# Patient Record
Sex: FEMALE | Race: WHITE | Employment: FULL TIME | URBAN - METROPOLITAN AREA
[De-identification: names, ages, dates, MRNs, and addresses within clinical notes are randomized per-mention and may not be internally consistent; named-entity substitution may affect disease eponyms.]

---

## 2017-05-25 PROBLEM — F32.A DEPRESSIVE DISORDER: Chronic | Status: ACTIVE | Noted: 2017-05-25

## 2021-01-08 ENCOUNTER — APPOINTMENT (OUTPATIENT)
Dept: GENERAL RADIOLOGY | Age: 51
DRG: 863 | End: 2021-01-08
Payer: COMMERCIAL

## 2021-01-08 ENCOUNTER — HOSPITAL ENCOUNTER (INPATIENT)
Age: 51
LOS: 4 days | Discharge: HOME | DRG: 863 | End: 2021-01-12
Attending: EMERGENCY MEDICINE | Admitting: INTERNAL MEDICINE
Payer: COMMERCIAL

## 2021-01-08 DIAGNOSIS — T81.49XA SURGICAL WOUND INFECTION: ICD-10-CM

## 2021-01-08 DIAGNOSIS — S91.309A OPEN WOUND OF FOOT EXCLUDING TOES: ICD-10-CM

## 2021-01-08 DIAGNOSIS — E87.1 HYPONATREMIA: Primary | ICD-10-CM

## 2021-01-08 LAB
ALBUMIN SERPL-MCNC: 4.4 G/DL (ref 3.5–5.2)
ALP BLD-CCNC: 77 U/L (ref 35–104)
ALT SERPL-CCNC: 22 U/L (ref 0–32)
ANION GAP SERPL CALCULATED.3IONS-SCNC: 9 MMOL/L (ref 7–16)
AST SERPL-CCNC: 22 U/L (ref 0–31)
BACTERIA: ABNORMAL /HPF
BASOPHILS ABSOLUTE: 0.08 E9/L (ref 0–0.2)
BASOPHILS RELATIVE PERCENT: 0.8 % (ref 0–2)
BILIRUB SERPL-MCNC: 0.4 MG/DL (ref 0–1.2)
BILIRUBIN URINE: NEGATIVE
BLOOD, URINE: ABNORMAL
BUN BLDV-MCNC: 10 MG/DL (ref 6–20)
C-REACTIVE PROTEIN: 0.1 MG/DL (ref 0–0.4)
CALCIUM SERPL-MCNC: 9.3 MG/DL (ref 8.6–10.2)
CHLORIDE BLD-SCNC: 90 MMOL/L (ref 98–107)
CHLORIDE URINE RANDOM: 50 MMOL/L
CLARITY: CLEAR
CO2: 24 MMOL/L (ref 22–29)
COLOR: YELLOW
CREAT SERPL-MCNC: 0.7 MG/DL (ref 0.5–1)
CREATININE URINE: 75 MG/DL (ref 29–226)
EOSINOPHILS ABSOLUTE: 0.23 E9/L (ref 0.05–0.5)
EOSINOPHILS RELATIVE PERCENT: 2.3 % (ref 0–6)
GFR AFRICAN AMERICAN: >60
GFR NON-AFRICAN AMERICAN: >60 ML/MIN/1.73
GLUCOSE BLD-MCNC: 98 MG/DL (ref 74–99)
GLUCOSE URINE: NEGATIVE MG/DL
HCT VFR BLD CALC: 37.8 % (ref 34–48)
HEMOGLOBIN: 12.8 G/DL (ref 11.5–15.5)
IMMATURE GRANULOCYTES #: 0.07 E9/L
IMMATURE GRANULOCYTES %: 0.7 % (ref 0–5)
KETONES, URINE: ABNORMAL MG/DL
LACTIC ACID: 1 MMOL/L (ref 0.5–2.2)
LEUKOCYTE ESTERASE, URINE: NEGATIVE
LYMPHOCYTES ABSOLUTE: 2.14 E9/L (ref 1.5–4)
LYMPHOCYTES RELATIVE PERCENT: 21.7 % (ref 20–42)
MCH RBC QN AUTO: 31.7 PG (ref 26–35)
MCHC RBC AUTO-ENTMCNC: 33.9 % (ref 32–34.5)
MCV RBC AUTO: 93.6 FL (ref 80–99.9)
MONOCYTES ABSOLUTE: 0.74 E9/L (ref 0.1–0.95)
MONOCYTES RELATIVE PERCENT: 7.5 % (ref 2–12)
NEUTROPHILS ABSOLUTE: 6.61 E9/L (ref 1.8–7.3)
NEUTROPHILS RELATIVE PERCENT: 67 % (ref 43–80)
NITRITE, URINE: NEGATIVE
OSMOLALITY URINE: 411 MOSM/KG (ref 300–900)
OSMOLALITY: 273 MOSM/KG (ref 285–310)
PDW BLD-RTO: 13.6 FL (ref 11.5–15)
PH UA: 5.5 (ref 5–9)
PLATELET # BLD: 490 E9/L (ref 130–450)
PMV BLD AUTO: 9 FL (ref 7–12)
POTASSIUM REFLEX MAGNESIUM: 3.8 MMOL/L (ref 3.5–5)
POTASSIUM, UR: 44.5 MMOL/L
PROTEIN UA: NEGATIVE MG/DL
RBC # BLD: 4.04 E12/L (ref 3.5–5.5)
RBC UA: ABNORMAL /HPF (ref 0–2)
SEDIMENTATION RATE, ERYTHROCYTE: 25 MM/HR (ref 0–20)
SODIUM BLD-SCNC: 123 MMOL/L (ref 132–146)
SODIUM URINE: 45 MMOL/L
SPECIFIC GRAVITY UA: 1.01 (ref 1–1.03)
TOTAL PROTEIN: 7.5 G/DL (ref 6.4–8.3)
TSH SERPL DL<=0.05 MIU/L-ACNC: 2.21 UIU/ML (ref 0.27–4.2)
UROBILINOGEN, URINE: 0.2 E.U./DL
WBC # BLD: 9.9 E9/L (ref 4.5–11.5)
WBC UA: ABNORMAL /HPF (ref 0–5)

## 2021-01-08 PROCEDURE — 85651 RBC SED RATE NONAUTOMATED: CPT

## 2021-01-08 PROCEDURE — 81001 URINALYSIS AUTO W/SCOPE: CPT

## 2021-01-08 PROCEDURE — 87070 CULTURE OTHR SPECIMN AEROBIC: CPT

## 2021-01-08 PROCEDURE — 84300 ASSAY OF URINE SODIUM: CPT

## 2021-01-08 PROCEDURE — 1200000000 HC SEMI PRIVATE

## 2021-01-08 PROCEDURE — 80053 COMPREHEN METABOLIC PANEL: CPT

## 2021-01-08 PROCEDURE — 99283 EMERGENCY DEPT VISIT LOW MDM: CPT

## 2021-01-08 PROCEDURE — 82436 ASSAY OF URINE CHLORIDE: CPT

## 2021-01-08 PROCEDURE — 96374 THER/PROPH/DIAG INJ IV PUSH: CPT

## 2021-01-08 PROCEDURE — 87040 BLOOD CULTURE FOR BACTERIA: CPT

## 2021-01-08 PROCEDURE — 86140 C-REACTIVE PROTEIN: CPT

## 2021-01-08 PROCEDURE — 84133 ASSAY OF URINE POTASSIUM: CPT

## 2021-01-08 PROCEDURE — 2580000003 HC RX 258: Performed by: NURSE PRACTITIONER

## 2021-01-08 PROCEDURE — 85025 COMPLETE CBC W/AUTO DIFF WBC: CPT

## 2021-01-08 PROCEDURE — 83935 ASSAY OF URINE OSMOLALITY: CPT

## 2021-01-08 PROCEDURE — 6370000000 HC RX 637 (ALT 250 FOR IP): Performed by: INTERNAL MEDICINE

## 2021-01-08 PROCEDURE — 36415 COLL VENOUS BLD VENIPUNCTURE: CPT

## 2021-01-08 PROCEDURE — 6360000002 HC RX W HCPCS: Performed by: INTERNAL MEDICINE

## 2021-01-08 PROCEDURE — 73630 X-RAY EXAM OF FOOT: CPT

## 2021-01-08 PROCEDURE — 2500000003 HC RX 250 WO HCPCS: Performed by: NURSE PRACTITIONER

## 2021-01-08 PROCEDURE — 83930 ASSAY OF BLOOD OSMOLALITY: CPT

## 2021-01-08 PROCEDURE — 83605 ASSAY OF LACTIC ACID: CPT

## 2021-01-08 PROCEDURE — 82570 ASSAY OF URINE CREATININE: CPT

## 2021-01-08 PROCEDURE — 87075 CULTR BACTERIA EXCEPT BLOOD: CPT

## 2021-01-08 PROCEDURE — 84443 ASSAY THYROID STIM HORMONE: CPT

## 2021-01-08 RX ORDER — ALBUTEROL SULFATE 2.5 MG/3ML
2.5 SOLUTION RESPIRATORY (INHALATION) EVERY 6 HOURS PRN
Status: DISCONTINUED | OUTPATIENT
Start: 2021-01-08 | End: 2021-01-12 | Stop reason: HOSPADM

## 2021-01-08 RX ORDER — AMOXICILLIN AND CLAVULANATE POTASSIUM 875; 125 MG/1; MG/1
1 TABLET, FILM COATED ORAL EVERY 12 HOURS SCHEDULED
Status: DISCONTINUED | OUTPATIENT
Start: 2021-01-08 | End: 2021-01-12 | Stop reason: HOSPADM

## 2021-01-08 RX ORDER — LORAZEPAM 1 MG/1
1 TABLET ORAL 2 TIMES DAILY
Status: DISCONTINUED | OUTPATIENT
Start: 2021-01-08 | End: 2021-01-12 | Stop reason: HOSPADM

## 2021-01-08 RX ORDER — AMOXICILLIN AND CLAVULANATE POTASSIUM 875; 125 MG/1; MG/1
1 TABLET, FILM COATED ORAL 2 TIMES DAILY
COMMUNITY

## 2021-01-08 RX ORDER — 0.9 % SODIUM CHLORIDE 0.9 %
1000 INTRAVENOUS SOLUTION INTRAVENOUS ONCE
Status: COMPLETED | OUTPATIENT
Start: 2021-01-08 | End: 2021-01-08

## 2021-01-08 RX ORDER — ALBUTEROL SULFATE 90 UG/1
2 AEROSOL, METERED RESPIRATORY (INHALATION) EVERY 6 HOURS PRN
Status: DISCONTINUED | OUTPATIENT
Start: 2021-01-08 | End: 2021-01-08 | Stop reason: CLARIF

## 2021-01-08 RX ORDER — LORAZEPAM 1 MG/1
1 TABLET ORAL 2 TIMES DAILY
COMMUNITY

## 2021-01-08 RX ORDER — HYDROCODONE BITARTRATE AND ACETAMINOPHEN 5; 325 MG/1; MG/1
1 TABLET ORAL EVERY 6 HOURS PRN
COMMUNITY

## 2021-01-08 RX ORDER — HYDROCODONE BITARTRATE AND ACETAMINOPHEN 5; 325 MG/1; MG/1
1 TABLET ORAL EVERY 6 HOURS PRN
Status: DISCONTINUED | OUTPATIENT
Start: 2021-01-08 | End: 2021-01-12 | Stop reason: HOSPADM

## 2021-01-08 RX ORDER — BUSPIRONE HYDROCHLORIDE 15 MG/1
15 TABLET ORAL 2 TIMES DAILY
COMMUNITY

## 2021-01-08 RX ORDER — HEPARIN SODIUM 10000 [USP'U]/ML
5000 INJECTION, SOLUTION INTRAVENOUS; SUBCUTANEOUS 2 TIMES DAILY
Status: DISCONTINUED | OUTPATIENT
Start: 2021-01-08 | End: 2021-01-12 | Stop reason: HOSPADM

## 2021-01-08 RX ORDER — CETIRIZINE HYDROCHLORIDE 10 MG/1
10 TABLET ORAL DAILY
COMMUNITY

## 2021-01-08 RX ORDER — BUDESONIDE AND FORMOTEROL FUMARATE DIHYDRATE 160; 4.5 UG/1; UG/1
1 AEROSOL RESPIRATORY (INHALATION) EVERY 12 HOURS
Status: DISCONTINUED | OUTPATIENT
Start: 2021-01-08 | End: 2021-01-08 | Stop reason: CLARIF

## 2021-01-08 RX ORDER — IBUPROFEN 800 MG/1
800 TABLET ORAL EVERY 6 HOURS PRN
Status: DISCONTINUED | OUTPATIENT
Start: 2021-01-08 | End: 2021-01-12 | Stop reason: HOSPADM

## 2021-01-08 RX ORDER — BUDESONIDE 0.5 MG/2ML
0.5 INHALANT ORAL 2 TIMES DAILY
Status: DISCONTINUED | OUTPATIENT
Start: 2021-01-08 | End: 2021-01-12 | Stop reason: HOSPADM

## 2021-01-08 RX ORDER — CLINDAMYCIN PHOSPHATE 600 MG/50ML
600 INJECTION INTRAVENOUS ONCE
Status: COMPLETED | OUTPATIENT
Start: 2021-01-08 | End: 2021-01-08

## 2021-01-08 RX ORDER — SERTRALINE HYDROCHLORIDE 100 MG/1
100 TABLET, FILM COATED ORAL DAILY
Status: DISCONTINUED | OUTPATIENT
Start: 2021-01-08 | End: 2021-01-12 | Stop reason: HOSPADM

## 2021-01-08 RX ORDER — BUSPIRONE HYDROCHLORIDE 5 MG/1
15 TABLET ORAL 2 TIMES DAILY
Status: DISCONTINUED | OUTPATIENT
Start: 2021-01-08 | End: 2021-01-12 | Stop reason: HOSPADM

## 2021-01-08 RX ORDER — SERTRALINE HYDROCHLORIDE 100 MG/1
100 TABLET, FILM COATED ORAL DAILY
COMMUNITY

## 2021-01-08 RX ORDER — ARFORMOTEROL TARTRATE 15 UG/2ML
15 SOLUTION RESPIRATORY (INHALATION) 2 TIMES DAILY
Status: DISCONTINUED | OUTPATIENT
Start: 2021-01-08 | End: 2021-01-12 | Stop reason: HOSPADM

## 2021-01-08 RX ORDER — CETIRIZINE HYDROCHLORIDE 10 MG/1
10 TABLET ORAL DAILY
Status: DISCONTINUED | OUTPATIENT
Start: 2021-01-09 | End: 2021-01-12 | Stop reason: HOSPADM

## 2021-01-08 RX ADMIN — BUSPIRONE HYDROCHLORIDE 15 MG: 5 TABLET ORAL at 23:45

## 2021-01-08 RX ADMIN — HEPARIN SODIUM 5000 UNITS: 10000 INJECTION, SOLUTION INTRAVENOUS; SUBCUTANEOUS at 21:30

## 2021-01-08 RX ADMIN — SERTRALINE 100 MG: 100 TABLET, FILM COATED ORAL at 23:39

## 2021-01-08 RX ADMIN — SODIUM CHLORIDE 1000 ML: 9 INJECTION, SOLUTION INTRAVENOUS at 18:49

## 2021-01-08 RX ADMIN — CLINDAMYCIN IN 5 PERCENT DEXTROSE 600 MG: 12 INJECTION, SOLUTION INTRAVENOUS at 18:41

## 2021-01-08 RX ADMIN — AMOXICILLIN AND CLAVULANATE POTASSIUM 1 TABLET: 875; 125 TABLET, FILM COATED ORAL at 23:45

## 2021-01-08 RX ADMIN — HYDROCODONE BITARTRATE AND ACETAMINOPHEN 1 TABLET: 5; 325 TABLET ORAL at 23:39

## 2021-01-08 RX ADMIN — LORAZEPAM 1 MG: 1 TABLET ORAL at 23:39

## 2021-01-08 ASSESSMENT — PAIN - FUNCTIONAL ASSESSMENT: PAIN_FUNCTIONAL_ASSESSMENT: ACTIVITIES ARE NOT PREVENTED

## 2021-01-08 ASSESSMENT — PAIN DESCRIPTION - LOCATION: LOCATION: FOOT

## 2021-01-08 ASSESSMENT — PAIN DESCRIPTION - ONSET: ONSET: ON-GOING

## 2021-01-08 ASSESSMENT — PAIN DESCRIPTION - PAIN TYPE: TYPE: SURGICAL PAIN

## 2021-01-08 ASSESSMENT — PAIN DESCRIPTION - ORIENTATION: ORIENTATION: RIGHT

## 2021-01-08 ASSESSMENT — PAIN SCALES - GENERAL: PAINLEVEL_OUTOF10: 6

## 2021-01-08 NOTE — ED PROVIDER NOTES
ED Attending  CC: Rima       [facility]  Department of Emergency Medicine   ED  Encounter Note  Admit Date/RoomTime: 2021  3:29 PM  ED Room: [facility]/8380-F    NAME: Jazmín Lo  : 1970  MRN: [id number]     Chief Complaint:  Wound Check (had bunionectomy  and is infected. on augmentin, but getting worse )    History of Present Illness        Jazmín Lo is a 44 y.o. old female who presents to the emergency department by private vehicle, for evaluation of surgical incision located on right medial foot, which occured 12 day(s) prior to arrival.  Patient had a bunionectomy on right foot on 2020. Had a 1 week follow-up on Monday and when had Steri-Strips placed and was placed on Augmentin twice daily for 10 days. . The wound is / has open, tender and serosanguinous drainage. She denies any fevers, chills, shortness breath, chest pain, erythema. Surgery is employed at [facility] by Dr. Francis Polanco      ROS   Pertinent positives and negatives are stated within HPI, all other systems reviewed and are negative. Past Medical History:  has a past medical history of Anxiety, Asthma, Chronic back pain greater than 3 months duration, Concussion, and Pneumonia. Surgical History:  has a past surgical history that includes  section; Bunionectomy (Bilateral, 2020); and Achilles tendon surgery (Left, 2020). Social History:  reports that she has never smoked. She has never used smokeless tobacco. She reports that she does not drink alcohol or use drugs. Family History: family history includes Cancer in her father and sister. Allergies: Patient has no known allergies.     Physical Exam   Oxygen Saturation Interpretation: Normal.        ED Triage Vitals [21 1525]   BP Temp Temp Source Pulse Resp SpO2 Height Weight   (!) 140/77 98.9 °F (37.2 °C) Tympanic 108 16 99 % 5' 1\" (1.549 m) 127 lb (57.6 kg)           Constitutional:  Alert, development consistent with age. HEENT:  NC/NT. Airway patent. Neck:  Normal ROM. Supple. Respiratory:  Clear to auscultation and breath sounds equal.  CV:  Regular rate and rhythm, normal heart sounds, without pathological murmurs, ectopy, gallops, or rubs. GI:  Abdomen Soft, nontender, good bowel sounds. No firm or pulsatile mass. Back:  No costovertebral tenderness. Extremities: No tenderness or edema noted. SEE PICTURE BELOW FOR RIGHT FOOT. PT and DP pulses are 2+. Cap refill is less than 3 secs. patient has second incision that is in the healing process on the lateral aspect of the right foot also. Integument:  Normal turgor. Warm, dry, without visible rash, unless noted elsewhere. Lymphatics: No lymphangitis or adenopathy noted. Neurological:  Oriented. Motor functions intact.              Lab / Imaging Results   (All laboratory and radiology results have been personally reviewed by myself)  Labs:  Results for orders placed or performed during the hospital encounter of 01/08/21   CBC auto differential   Result Value Ref Range    WBC 9.9 4.5 - 11.5 E9/L    RBC 4.04 3.50 - 5.50 E12/L    Hemoglobin 12.8 11.5 - 15.5 g/dL    Hematocrit 37.8 34.0 - 48.0 %    MCV 93.6 80.0 - 99.9 fL    MCH 31.7 26.0 - 35.0 pg    MCHC 33.9 32.0 - 34.5 %    RDW 13.6 11.5 - 15.0 fL    Platelets 489 (H) 457 - 450 E9/L    MPV 9.0 7.0 - 12.0 fL    Neutrophils % 67.0 43.0 - 80.0 %    Immature Granulocytes % 0.7 0.0 - 5.0 %    Lymphocytes % 21.7 20.0 - 42.0 %    Monocytes % 7.5 2.0 - 12.0 %    Eosinophils % 2.3 0.0 - 6.0 %    Basophils % 0.8 0.0 - 2.0 %    Neutrophils Absolute 6.61 1.80 - 7.30 E9/L    Immature Granulocytes # 0.07 E9/L    Lymphocytes Absolute 2.14 1.50 - 4.00 E9/L    Monocytes Absolute 0.74 0.10 - 0.95 E9/L    Eosinophils Absolute 0.23 0.05 - 0.50 E9/L    Basophils Absolute 0.08 0.00 - 0.20 E9/L   Comprehensive Metabolic Panel w/ Reflex to MG   Result Value Ref Range    Sodium 123 (L) 132 - 146 mmol/L    Potassium reflex

## 2021-01-09 PROBLEM — J45.909 MODERATE ASTHMA: Status: ACTIVE | Noted: 2021-01-09

## 2021-01-09 PROBLEM — F41.9 ANXIETY: Status: ACTIVE | Noted: 2021-01-09

## 2021-01-09 PROBLEM — T81.49XA SURGICAL WOUND INFECTION: Status: ACTIVE | Noted: 2021-01-09

## 2021-01-09 LAB
ALBUMIN SERPL-MCNC: 3.8 G/DL (ref 3.5–5.2)
ALP BLD-CCNC: 70 U/L (ref 35–104)
ALT SERPL-CCNC: 19 U/L (ref 0–32)
ANION GAP SERPL CALCULATED.3IONS-SCNC: 8 MMOL/L (ref 7–16)
AST SERPL-CCNC: 21 U/L (ref 0–31)
BILIRUB SERPL-MCNC: 0.4 MG/DL (ref 0–1.2)
BUN BLDV-MCNC: 8 MG/DL (ref 6–20)
CALCIUM SERPL-MCNC: 9 MG/DL (ref 8.6–10.2)
CHLORIDE BLD-SCNC: 102 MMOL/L (ref 98–107)
CO2: 24 MMOL/L (ref 22–29)
CREAT SERPL-MCNC: 0.5 MG/DL (ref 0.5–1)
GFR AFRICAN AMERICAN: >60
GFR NON-AFRICAN AMERICAN: >60 ML/MIN/1.73
GLUCOSE BLD-MCNC: 102 MG/DL (ref 74–99)
HCT VFR BLD CALC: 35.1 % (ref 34–48)
HEMOGLOBIN: 11.5 G/DL (ref 11.5–15.5)
MCH RBC QN AUTO: 31.1 PG (ref 26–35)
MCHC RBC AUTO-ENTMCNC: 32.8 % (ref 32–34.5)
MCV RBC AUTO: 94.9 FL (ref 80–99.9)
PDW BLD-RTO: 13.7 FL (ref 11.5–15)
PLATELET # BLD: 446 E9/L (ref 130–450)
PMV BLD AUTO: 8.8 FL (ref 7–12)
POTASSIUM SERPL-SCNC: 3.9 MMOL/L (ref 3.5–5)
RBC # BLD: 3.7 E12/L (ref 3.5–5.5)
SODIUM BLD-SCNC: 134 MMOL/L (ref 132–146)
TOTAL PROTEIN: 6.6 G/DL (ref 6.4–8.3)
URIC ACID, SERUM: 2.3 MG/DL (ref 2.4–5.7)
WBC # BLD: 9.6 E9/L (ref 4.5–11.5)

## 2021-01-09 PROCEDURE — 2500000003 HC RX 250 WO HCPCS: Performed by: INTERNAL MEDICINE

## 2021-01-09 PROCEDURE — 2580000003 HC RX 258: Performed by: INTERNAL MEDICINE

## 2021-01-09 PROCEDURE — 6370000000 HC RX 637 (ALT 250 FOR IP): Performed by: INTERNAL MEDICINE

## 2021-01-09 PROCEDURE — 85027 COMPLETE CBC AUTOMATED: CPT

## 2021-01-09 PROCEDURE — 1200000000 HC SEMI PRIVATE

## 2021-01-09 PROCEDURE — 36415 COLL VENOUS BLD VENIPUNCTURE: CPT

## 2021-01-09 PROCEDURE — 80053 COMPREHEN METABOLIC PANEL: CPT

## 2021-01-09 PROCEDURE — 84550 ASSAY OF BLOOD/URIC ACID: CPT

## 2021-01-09 RX ORDER — CLINDAMYCIN PHOSPHATE 600 MG/50ML
600 INJECTION INTRAVENOUS EVERY 8 HOURS
Status: DISCONTINUED | OUTPATIENT
Start: 2021-01-09 | End: 2021-01-12 | Stop reason: HOSPADM

## 2021-01-09 RX ORDER — DEXTROSE MONOHYDRATE 50 MG/ML
INJECTION, SOLUTION INTRAVENOUS CONTINUOUS
Status: DISCONTINUED | OUTPATIENT
Start: 2021-01-09 | End: 2021-01-10

## 2021-01-09 RX ADMIN — CLINDAMYCIN PHOSPHATE 600 MG: 600 INJECTION, SOLUTION INTRAVENOUS at 23:54

## 2021-01-09 RX ADMIN — BUSPIRONE HYDROCHLORIDE 15 MG: 5 TABLET ORAL at 08:21

## 2021-01-09 RX ADMIN — AMOXICILLIN AND CLAVULANATE POTASSIUM 1 TABLET: 875; 125 TABLET, FILM COATED ORAL at 08:21

## 2021-01-09 RX ADMIN — BUSPIRONE HYDROCHLORIDE 15 MG: 5 TABLET ORAL at 20:35

## 2021-01-09 RX ADMIN — LORAZEPAM 1 MG: 1 TABLET ORAL at 20:35

## 2021-01-09 RX ADMIN — CLINDAMYCIN PHOSPHATE 600 MG: 600 INJECTION, SOLUTION INTRAVENOUS at 08:45

## 2021-01-09 RX ADMIN — HYDROCODONE BITARTRATE AND ACETAMINOPHEN 1 TABLET: 5; 325 TABLET ORAL at 15:23

## 2021-01-09 RX ADMIN — CETIRIZINE HYDROCHLORIDE 10 MG: 10 TABLET, FILM COATED ORAL at 08:21

## 2021-01-09 RX ADMIN — DEXTROSE MONOHYDRATE: 50 INJECTION, SOLUTION INTRAVENOUS at 10:36

## 2021-01-09 RX ADMIN — HYDROCODONE BITARTRATE AND ACETAMINOPHEN 1 TABLET: 5; 325 TABLET ORAL at 08:50

## 2021-01-09 RX ADMIN — AMOXICILLIN AND CLAVULANATE POTASSIUM 1 TABLET: 875; 125 TABLET, FILM COATED ORAL at 20:35

## 2021-01-09 RX ADMIN — SERTRALINE 100 MG: 100 TABLET, FILM COATED ORAL at 08:21

## 2021-01-09 RX ADMIN — CLINDAMYCIN PHOSPHATE 600 MG: 600 INJECTION, SOLUTION INTRAVENOUS at 15:50

## 2021-01-09 RX ADMIN — LORAZEPAM 1 MG: 1 TABLET ORAL at 08:21

## 2021-01-09 RX ADMIN — HYDROCODONE BITARTRATE AND ACETAMINOPHEN 1 TABLET: 5; 325 TABLET ORAL at 21:46

## 2021-01-09 ASSESSMENT — PAIN DESCRIPTION - PROGRESSION: CLINICAL_PROGRESSION: NOT CHANGED

## 2021-01-09 ASSESSMENT — PAIN DESCRIPTION - LOCATION
LOCATION: HEAD;FOOT
LOCATION: FOOT

## 2021-01-09 NOTE — H&P
History and Physical  Internal Medicine  Shantelle Ugarte MD    CHIEF COMPLAINT:  drainage      HISTORY OF PRESENT ILLNESS:      The patient is a 44 y.o. female patient of mine who presents with drainage from foot wound - as per ER -   Sharmila Paulina is a 44 y.o. old female who presents to the emergency department by private vehicle, for evaluation of surgical incision located on right medial foot, which occured 12 day(s) prior to arrival.  Patient had a bunionectomy on right foot on 2020. Had a 1 week follow-up on Monday and when had Steri-Strips placed and was placed on Augmentin twice daily for 10 days. . The wound is / has open, tender and serosanguinous drainage. She denies any fevers, chills, shortness breath, chest pain, erythema. Surgery is employed at [address] [place] [address] by Dr. Alix Vega         Past Medical History:   Diagnosis Date    Anxiety     Asthma     Chronic back pain greater than 3 months duration     Concussion     Pneumonia            Past Surgical History:   Procedure Laterality Date    ACHILLES TENDON SURGERY Left 2020    legnthenting    BUNIONECTOMY Bilateral 2020     SECTION         Medications Prior to Admission:    Medications Prior to Admission: fluticasone-salmeterol (ADVAIR) 500-50 MCG/DOSE diskus inhaler, Inhale 1 puff into the lungs every 12 hours  sertraline (ZOLOFT) 100 MG tablet, Take 100 mg by mouth daily At night  busPIRone (BUSPAR) 15 MG tablet, Take 15 mg by mouth 2 times daily  LORazepam (ATIVAN) 1 MG tablet, Take 1 mg by mouth 2 times daily.   HYDROcodone-acetaminophen (NORCO) 5-325 MG per tablet, Take 1 tablet by mouth every 6 hours as needed for Pain.  amoxicillin-clavulanate (AUGMENTIN) 875-125 MG per tablet, Take 1 tablet by mouth 2 times daily  cetirizine (ZYRTEC) 10 MG tablet, Take 10 mg by mouth daily  albuterol sulfate  (90 BASE) MCG/ACT inhaler, Inhale 2 puffs into the lungs every 6 hours as needed for Wheezing  ibuprofen (ADVIL;MOTRIN) 800 MG tablet, Take 800 mg by mouth every 6 hours as needed for Pain    Allergies:    Patient has no known allergies. Social History:    reports that she has never smoked. She has never used smokeless tobacco. She reports that she does not drink alcohol or use drugs. Family History:   family history includes Cancer in her father and sister. REVIEW OF SYSTEMS:  As above in the HPI, otherwise negative    Vital Signs:  /72   Pulse 84   Temp 98.4 °F (36.9 °C) (Oral)   Resp 16   Ht 5' (1.524 m)   Wt 133 lb (60.3 kg)   SpO2 100%   BMI 25.97 kg/m²     Physical Exam:    General appearance: alert, appears stated age and cooperative  Head: Normocephalic, without obvious abnormality, atraumatic  Eyes: conjunctivae/corneas clear. PERRL, EOM's intact. Fundi benign. Throat: lips, mucosa, and tongue normal; teeth and gums normal  Neck: no adenopathy, no carotid bruit, no JVD, supple, symmetrical, trachea midline and thyroid not enlarged, symmetric, no tenderness/mass/nodules  Back: symmetric, no curvature. ROM normal. No CVA tenderness.   Lungs: clear to auscultation bilaterally  Chest wall: no tenderness  Heart: regular rate and rhythm, S1, S2 normal, no murmur, click, rub or gallop  Abdomen: soft, non-tender; bowel sounds normal; no masses,  no organomegaly  Extremities: extremities normal, atraumatic, no cyanosis or edema and wound looks dry no erythema no drainage   Pulses: 2+ and symmetric  Skin: Skin color, texture, turgor normal. No rashes or lesions  Lymph nodes: Cervical, supraclavicular, and axillary nodes normal.  Neurologic: Grossly normal  Rectal: deferred    LABS:    Recent Results (from the past 24 hour(s))   Lactic Acid, Plasma    Collection Time: 01/08/21  4:13 PM   Result Value Ref Range    Lactic Acid 1.0 0.5 - 2.2 mmol/L   CBC auto differential    Collection Time: 01/08/21  4:20 PM   Result Value Ref Range    WBC 9.9 4.5 - 11.5 E9/L    RBC 4.04 3.50 - 5.50 E12/L Hemoglobin 12.8 11.5 - 15.5 g/dL    Hematocrit 37.8 34.0 - 48.0 %    MCV 93.6 80.0 - 99.9 fL    MCH 31.7 26.0 - 35.0 pg    MCHC 33.9 32.0 - 34.5 %    RDW 13.6 11.5 - 15.0 fL    Platelets 212 (H) 329 - 450 E9/L    MPV 9.0 7.0 - 12.0 fL    Neutrophils % 67.0 43.0 - 80.0 %    Immature Granulocytes % 0.7 0.0 - 5.0 %    Lymphocytes % 21.7 20.0 - 42.0 %    Monocytes % 7.5 2.0 - 12.0 %    Eosinophils % 2.3 0.0 - 6.0 %    Basophils % 0.8 0.0 - 2.0 %    Neutrophils Absolute 6.61 1.80 - 7.30 E9/L    Immature Granulocytes # 0.07 E9/L    Lymphocytes Absolute 2.14 1.50 - 4.00 E9/L    Monocytes Absolute 0.74 0.10 - 0.95 E9/L    Eosinophils Absolute 0.23 0.05 - 0.50 E9/L    Basophils Absolute 0.08 0.00 - 0.20 E9/L   Comprehensive Metabolic Panel w/ Reflex to MG    Collection Time: 01/08/21  4:20 PM   Result Value Ref Range    Sodium 123 (L) 132 - 146 mmol/L    Potassium reflex Magnesium 3.8 3.5 - 5.0 mmol/L    Chloride 90 (L) 98 - 107 mmol/L    CO2 24 22 - 29 mmol/L    Anion Gap 9 7 - 16 mmol/L    Glucose 98 74 - 99 mg/dL    BUN 10 6 - 20 mg/dL    CREATININE 0.7 0.5 - 1.0 mg/dL    GFR Non-African American >60 >=60 mL/min/1.73    GFR African American >60     Calcium 9.3 8.6 - 10.2 mg/dL    Total Protein 7.5 6.4 - 8.3 g/dL    Alb 4.4 3.5 - 5.2 g/dL    Total Bilirubin 0.4 0.0 - 1.2 mg/dL    Alkaline Phosphatase 77 35 - 104 U/L    ALT 22 0 - 32 U/L    AST 22 0 - 31 U/L   C-reactive protein    Collection Time: 01/08/21  4:20 PM   Result Value Ref Range    CRP 0.1 0.0 - 0.4 mg/dL   Sedimentation Rate    Collection Time: 01/08/21  4:20 PM   Result Value Ref Range    Sed Rate 25 (H) 0 - 20 mm/Hr   Urinalysis with Microscopic    Collection Time: 01/08/21  8:50 PM   Result Value Ref Range    Color, UA Yellow Straw/Yellow    Clarity, UA Clear Clear    Glucose, Ur Negative Negative mg/dL    Bilirubin Urine Negative Negative    Ketones, Urine TRACE (A) Negative mg/dL    Specific Gravity, UA 1.015 1.005 - 1.030    Blood, Urine SMALL (A) Negative    pH, UA 5.5 5.0 - 9.0    Protein, UA Negative Negative mg/dL    Urobilinogen, Urine 0.2 <2.0 E.U./dL    Nitrite, Urine Negative Negative    Leukocyte Esterase, Urine Negative Negative    WBC, UA NONE 0 - 5 /HPF    RBC, UA 1-3 0 - 2 /HPF    Bacteria, UA NONE SEEN None Seen /HPF   CREATININE, RANDOM URINE    Collection Time: 01/08/21  8:50 PM   Result Value Ref Range    Creatinine, Ur 75 29 - 226 mg/dL   URINE ELECTROLYTES    Collection Time: 01/08/21  8:50 PM   Result Value Ref Range    Sodium, Ur 45 Not Established mmol/L    Potassium, Ur 44.5 Not Established mmol/L    Chloride 50 Not Established mmol/L   OSMOLALITY, URINE    Collection Time: 01/08/21  8:50 PM   Result Value Ref Range    Osmolality, Ur 411 300 - 900 mOsm/kg   TSH without Reflex    Collection Time: 01/08/21 10:40 PM   Result Value Ref Range    TSH 2.210 0.270 - 4.200 uIU/mL   OSMOLALITY, SERUM    Collection Time: 01/08/21 10:40 PM   Result Value Ref Range    Osmolality 273 (L) 285 - 310 mOsm/Kg   CBC    Collection Time: 01/09/21  4:25 AM   Result Value Ref Range    WBC 9.6 4.5 - 11.5 E9/L    RBC 3.70 3.50 - 5.50 E12/L    Hemoglobin 11.5 11.5 - 15.5 g/dL    Hematocrit 35.1 34.0 - 48.0 %    MCV 94.9 80.0 - 99.9 fL    MCH 31.1 26.0 - 35.0 pg    MCHC 32.8 32.0 - 34.5 %    RDW 13.7 11.5 - 15.0 fL    Platelets 003 240 - 416 E9/L    MPV 8.8 7.0 - 12.0 fL   Comprehensive metabolic panel    Collection Time: 01/09/21  4:25 AM   Result Value Ref Range    Sodium 134 132 - 146 mmol/L    Potassium 3.9 3.5 - 5.0 mmol/L    Chloride 102 98 - 107 mmol/L    CO2 24 22 - 29 mmol/L    Anion Gap 8 7 - 16 mmol/L    Glucose 102 (H) 74 - 99 mg/dL    BUN 8 6 - 20 mg/dL    CREATININE 0.5 0.5 - 1.0 mg/dL    GFR Non-African American >60 >=60 mL/min/1.73    GFR African American >60     Calcium 9.0 8.6 - 10.2 mg/dL    Total Protein 6.6 6.4 - 8.3 g/dL    Alb 3.8 3.5 - 5.2 g/dL    Total Bilirubin 0.4 0.0 - 1.2 mg/dL    Alkaline Phosphatase 70 35 - 104 U/L    ALT 19 0 - 32 U/L    AST 21 0 - 31 U/L   Uric Acid    Collection Time: 21  4:25 AM   Result Value Ref Range    Uric Acid, Serum 2.3 (L) 2.4 - 5.7 mg/dL       Radiology:     Chest  Xray:  Results for orders placed during the hospital encounter of 16   XR Chest Standard TWO VW    Narrative Patient MRN:  [id number]  : 1970  Age: 48 years  Gender: Female    Order Date:  2016 11:28 PM    TECHNIQUE/NUMBER OF IMAGES/COMPARISON/CLINICAL HISTORY:    Chest PA and lateral views. 2 images, 2 views. COMPARISON: 2016. CLINICAL HISTORY: Shortness of breath. FINDINGS:   There is a mild S-shaped scoliotic curvature of the thoracolumbar  spine. Lungs are normally expanded. No infiltrates, consolidations or pleural  effusions are seen. The heart has normal Size and the mediastinum appears unremarkable. Impression 1. No acute cardiopulmonary process. Results for orders placed during the hospital encounter of 17   XR Chest Portable    Narrative Patient MRN:  [id number]  : 1970  Age: 48 years  Gender: Female    Order Date:  3/27/2017 7:32 PM    EXAM: XR CHEST PORTABLE    NUMBER OF IMAGES:  One, VIEWS:  One    INDICATION: Shortness of Breath     COMPARISON: Chest x-ray dated 2016    Findings:   The patient is rotated to the right. The lungs are without evidence of  acute infiltrate. The pulmonary vasculature and cardiomediastinal  silhouette appear within normal limits. Impression No evidence of acute pulmonary disease. Other:  none      ASSESSMENT:      Principal Problem:    Surgical wound infection  Active Problems:    Hyponatremia    Moderate asthma    Anxiety  Resolved Problems:    * No resolved hospital problems.  *      PLAN:    ESR OK  CRP normal   Wound looks good  Will await ID - dose clindamycin til then as she is anxious  That said I am not 100% convinced she needs to stay  May very well be able to go later today and follow up with Danay

## 2021-01-09 NOTE — PROGRESS NOTES
Spoke with Dr. Becerril Friend on-call for Dr. Ayesha Fisher with new orders received. Also approved to continue all home meds. Ordered consults with Dr. Soheila Hickman and Dr. Kelly Underwood, which were both notified of the consults. Also spoke with Dr. Becerril Friend regarding patient being ordered a podiatry cosult. Patient stated she had her surgery at the [facility] and her doctor was and orthopedic surgeon not a podiatrist. Dr. Becerril Friend said Dr. Ayesha Fisher can refer her to someone when he sees her.

## 2021-01-10 LAB
ALBUMIN SERPL-MCNC: 3.7 G/DL (ref 3.5–5.2)
ALP BLD-CCNC: 67 U/L (ref 35–104)
ALT SERPL-CCNC: 15 U/L (ref 0–32)
ANION GAP SERPL CALCULATED.3IONS-SCNC: 6 MMOL/L (ref 7–16)
AST SERPL-CCNC: 17 U/L (ref 0–31)
BILIRUB SERPL-MCNC: <0.2 MG/DL (ref 0–1.2)
BUN BLDV-MCNC: 12 MG/DL (ref 6–20)
CALCIUM SERPL-MCNC: 8.8 MG/DL (ref 8.6–10.2)
CHLORIDE BLD-SCNC: 100 MMOL/L (ref 98–107)
CO2: 26 MMOL/L (ref 22–29)
CREAT SERPL-MCNC: 0.7 MG/DL (ref 0.5–1)
GFR AFRICAN AMERICAN: >60
GFR NON-AFRICAN AMERICAN: >60 ML/MIN/1.73
GLUCOSE BLD-MCNC: 96 MG/DL (ref 74–99)
HCT VFR BLD CALC: 34.6 % (ref 34–48)
HEMOGLOBIN: 11.3 G/DL (ref 11.5–15.5)
MCH RBC QN AUTO: 31.4 PG (ref 26–35)
MCHC RBC AUTO-ENTMCNC: 32.7 % (ref 32–34.5)
MCV RBC AUTO: 96.1 FL (ref 80–99.9)
PDW BLD-RTO: 14.1 FL (ref 11.5–15)
PLATELET # BLD: 460 E9/L (ref 130–450)
PMV BLD AUTO: 8.9 FL (ref 7–12)
POTASSIUM SERPL-SCNC: 4.3 MMOL/L (ref 3.5–5)
RBC # BLD: 3.6 E12/L (ref 3.5–5.5)
SODIUM BLD-SCNC: 132 MMOL/L (ref 132–146)
TOTAL PROTEIN: 6.5 G/DL (ref 6.4–8.3)
WBC # BLD: 6.9 E9/L (ref 4.5–11.5)

## 2021-01-10 PROCEDURE — 2580000003 HC RX 258: Performed by: INTERNAL MEDICINE

## 2021-01-10 PROCEDURE — 85027 COMPLETE CBC AUTOMATED: CPT

## 2021-01-10 PROCEDURE — 36415 COLL VENOUS BLD VENIPUNCTURE: CPT

## 2021-01-10 PROCEDURE — 1200000000 HC SEMI PRIVATE

## 2021-01-10 PROCEDURE — 6370000000 HC RX 637 (ALT 250 FOR IP): Performed by: INTERNAL MEDICINE

## 2021-01-10 PROCEDURE — 2500000003 HC RX 250 WO HCPCS: Performed by: INTERNAL MEDICINE

## 2021-01-10 PROCEDURE — 80053 COMPREHEN METABOLIC PANEL: CPT

## 2021-01-10 RX ADMIN — AMOXICILLIN AND CLAVULANATE POTASSIUM 1 TABLET: 875; 125 TABLET, FILM COATED ORAL at 08:14

## 2021-01-10 RX ADMIN — BUSPIRONE HYDROCHLORIDE 15 MG: 5 TABLET ORAL at 08:14

## 2021-01-10 RX ADMIN — LORAZEPAM 1 MG: 1 TABLET ORAL at 08:14

## 2021-01-10 RX ADMIN — DEXTROSE MONOHYDRATE: 50 INJECTION, SOLUTION INTRAVENOUS at 00:12

## 2021-01-10 RX ADMIN — HYDROCODONE BITARTRATE AND ACETAMINOPHEN 1 TABLET: 5; 325 TABLET ORAL at 08:13

## 2021-01-10 RX ADMIN — SERTRALINE 100 MG: 100 TABLET, FILM COATED ORAL at 08:14

## 2021-01-10 RX ADMIN — LORAZEPAM 1 MG: 1 TABLET ORAL at 20:17

## 2021-01-10 RX ADMIN — HYDROCODONE BITARTRATE AND ACETAMINOPHEN 1 TABLET: 5; 325 TABLET ORAL at 14:59

## 2021-01-10 RX ADMIN — CLINDAMYCIN PHOSPHATE 600 MG: 600 INJECTION, SOLUTION INTRAVENOUS at 08:14

## 2021-01-10 RX ADMIN — CLINDAMYCIN PHOSPHATE 600 MG: 600 INJECTION, SOLUTION INTRAVENOUS at 23:51

## 2021-01-10 RX ADMIN — BUSPIRONE HYDROCHLORIDE 15 MG: 5 TABLET ORAL at 20:17

## 2021-01-10 RX ADMIN — AMOXICILLIN AND CLAVULANATE POTASSIUM 1 TABLET: 875; 125 TABLET, FILM COATED ORAL at 20:17

## 2021-01-10 RX ADMIN — CLINDAMYCIN PHOSPHATE 600 MG: 600 INJECTION, SOLUTION INTRAVENOUS at 16:39

## 2021-01-10 RX ADMIN — DEXTROSE MONOHYDRATE: 50 INJECTION, SOLUTION INTRAVENOUS at 14:59

## 2021-01-10 RX ADMIN — CETIRIZINE HYDROCHLORIDE 10 MG: 10 TABLET, FILM COATED ORAL at 08:14

## 2021-01-10 RX ADMIN — HYDROCODONE BITARTRATE AND ACETAMINOPHEN 1 TABLET: 5; 325 TABLET ORAL at 21:30

## 2021-01-10 ASSESSMENT — PAIN DESCRIPTION - PROGRESSION: CLINICAL_PROGRESSION: NOT CHANGED

## 2021-01-10 ASSESSMENT — PAIN DESCRIPTION - DESCRIPTORS
DESCRIPTORS: ACHING;DISCOMFORT
DESCRIPTORS: ACHING;DISCOMFORT;SORE

## 2021-01-10 ASSESSMENT — PAIN SCALES - GENERAL: PAINLEVEL_OUTOF10: 2

## 2021-01-10 ASSESSMENT — PAIN DESCRIPTION - ORIENTATION: ORIENTATION: RIGHT;LOWER

## 2021-01-10 ASSESSMENT — PAIN DESCRIPTION - ONSET
ONSET: ON-GOING
ONSET: ON-GOING

## 2021-01-10 ASSESSMENT — PAIN - FUNCTIONAL ASSESSMENT
PAIN_FUNCTIONAL_ASSESSMENT: ACTIVITIES ARE NOT PREVENTED
PAIN_FUNCTIONAL_ASSESSMENT: ACTIVITIES ARE NOT PREVENTED

## 2021-01-10 ASSESSMENT — PAIN DESCRIPTION - PAIN TYPE: TYPE: ACUTE PAIN

## 2021-01-10 ASSESSMENT — PAIN DESCRIPTION - FREQUENCY: FREQUENCY: INTERMITTENT

## 2021-01-10 NOTE — PROGRESS NOTES
Internal Medicine  Progress Note  Abram Gomez MD     Subjective:     Patient is alert. Patient reports would like dressing to foot and/or orders on what to do. Tolerating diet well no other c/o. Scheduled Meds:   clindamycin (CLEOCIN) IV  600 mg Intravenous Q8H    heparin (porcine)  5,000 Units Subcutaneous BID    sertraline  100 mg Oral Daily    LORazepam  1 mg Oral BID    cetirizine  10 mg Oral Daily    busPIRone  15 mg Oral BID    amoxicillin-clavulanate  1 tablet Oral 2 times per day    budesonide  0.5 mg Nebulization BID    Arformoterol Tartrate  15 mcg Nebulization BID     Continuous Infusions:   dextrose 75 mL/hr at 01/10/21 0012     PRN Meds:HYDROcodone 5 mg - acetaminophen, ibuprofen, albuterol    Objective:      Physical Exam:  Vitals:   BP (!) 94/57   Pulse 88   Temp 98.7 °F (37.1 °C) (Oral)   Resp 14   Ht 5' (1.524 m)   Wt 136 lb 9.6 oz (62 kg)   SpO2 100%   BMI 26.68 kg/m²   I/O's    Intake/Output Summary (Last 24 hours) at 1/10/2021 0534  Last data filed at 1/9/2021 1820  Gross per 24 hour   Intake 1165 ml   Output --   Net 1165 ml     Exam:  General appearance: alert, appears stated age and cooperative  Head: Normocephalic, without obvious abnormality, atraumatic  Eyes: conjunctivae/corneas clear. PERRL, EOM's intact. Fundi benign. Throat: lips, mucosa, and tongue normal; teeth and gums normal  Neck: no adenopathy, no carotid bruit, no JVD, supple, symmetrical, trachea midline and thyroid not enlarged, symmetric, no tenderness/mass/nodules  Back: symmetric, no curvature. ROM normal. No CVA tenderness.   Lungs: clear to auscultation bilaterally  Chest wall: no tenderness  Heart: regular rate and rhythm  Abdomen: soft, non-tender; bowel sounds normal; no masses,  no organomegaly  Extremities: extremities normal, atraumatic, no cyanosis or edema  Pulses: 2+ and symmetric  Skin: Skin color, texture, turgor normal. No rashes or lesions  Lymph nodes: Cervical, supraclavicular, and axillary nodes normal.  Neurologic: Grossly normal      Imaging     Chest  Xray:  Results for orders placed during the hospital encounter of 16   XR Chest Standard TWO VW    Narrative Patient MRN:  [id number]  : 1970  Age: 48 years  Gender: Female    Order Date:  2016 11:28 PM    TECHNIQUE/NUMBER OF IMAGES/COMPARISON/CLINICAL HISTORY:    Chest PA and lateral views. 2 images, 2 views. COMPARISON: 2016. CLINICAL HISTORY: Shortness of breath. FINDINGS:   There is a mild S-shaped scoliotic curvature of the thoracolumbar  spine. Lungs are normally expanded. No infiltrates, consolidations or pleural  effusions are seen. The heart has normal Size and the mediastinum appears unremarkable. Impression 1. No acute cardiopulmonary process. Results for orders placed during the hospital encounter of 17   XR Chest Portable    Narrative Patient MRN:  [id number]  : 1970  Age: 48 years  Gender: Female    Order Date:  3/27/2017 7:32 PM    EXAM: XR CHEST PORTABLE    NUMBER OF IMAGES:  One, VIEWS:  One    INDICATION: Shortness of Breath     COMPARISON: Chest x-ray dated 2016    Findings:   The patient is rotated to the right. The lungs are without evidence of  acute infiltrate. The pulmonary vasculature and cardiomediastinal  silhouette appear within normal limits. Impression No evidence of acute pulmonary disease.          Additional Imaging:  none    Lab Review   Recent Results (from the past 24 hour(s))   CBC    Collection Time: 01/10/21  3:54 AM   Result Value Ref Range    WBC 6.9 4.5 - 11.5 E9/L    RBC 3.60 3.50 - 5.50 E12/L    Hemoglobin 11.3 (L) 11.5 - 15.5 g/dL    Hematocrit 34.6 34.0 - 48.0 %    MCV 96.1 80.0 - 99.9 fL    MCH 31.4 26.0 - 35.0 pg    MCHC 32.7 32.0 - 34.5 %    RDW 14.1 11.5 - 15.0 fL    Platelets 522 (H) 162 - 450 E9/L    MPV 8.9 7.0 - 12.0 fL   Comprehensive metabolic panel    Collection Time: 01/10/21  3:54 AM   Result Value Ref Range    Sodium 132 132 - 146 mmol/L    Potassium 4.3 3.5 - 5.0 mmol/L    Chloride 100 98 - 107 mmol/L    CO2 26 22 - 29 mmol/L    Anion Gap 6 (L) 7 - 16 mmol/L    Glucose 96 74 - 99 mg/dL    BUN 12 6 - 20 mg/dL    CREATININE 0.7 0.5 - 1.0 mg/dL    GFR Non-African American >60 >=60 mL/min/1.73    GFR African American >60     Calcium 8.8 8.6 - 10.2 mg/dL    Total Protein 6.5 6.4 - 8.3 g/dL    Alb 3.7 3.5 - 5.2 g/dL    Total Bilirubin <0.2 0.0 - 1.2 mg/dL    Alkaline Phosphatase 67 35 - 104 U/L    ALT 15 0 - 32 U/L    AST 17 0 - 31 U/L     Assessment:     Principal Problem:    Surgical wound infection  Active Problems:    Hyponatremia    Moderate asthma    Anxiety  Resolved Problems:    * No resolved hospital problems.  *      Plan:   She can be discharged as soon as she gets orders on wound care (whether that comes from [address] [place] [address]) irrelevant to me just so we can get her home  Andrew Deepika  1/10/2021  5:34 AM

## 2021-01-10 NOTE — CONSULTS
Podiatry Consult Note      Reason for Consult:  Right foot dehiscence. HISTORY OF PRESENT ILLNESS:      The patient is a 44 y.o. female admitted for right foot dehiscence. Patient had previous surgery on  by Dr. Charis Blanchard at the [facility]. She was recently in the office and had developed some mild dehiscence. Steri strips were applied. She noticed some further dehiscence and sent the picture to a family member who \"owns a wound care clinic and was told to go to the hospital\". Patient has been tolerating antibiotics. She denies n/v/f/c. She still has some pain in the foot which is controlled with narcotics. She denies signs of symptoms of dvt. Patient is scheduled for post op visit tomorrow at the [facility]. Patient has a fracture boot to use for the right foot. No other complaints noted. Past Medical History:    Past Medical History:   Diagnosis Date    Anxiety     Asthma     Chronic back pain greater than 3 months duration     Concussion     Pneumonia      Past Surgical History:    Past Surgical History:   Procedure Laterality Date    ACHILLES TENDON SURGERY Left 2020    legnthenting    BUNIONECTOMY Bilateral 2020     SECTION       Current Medications:     clindamycin (CLEOCIN) IV  600 mg Intravenous Q8H    heparin (porcine)  5,000 Units Subcutaneous BID    sertraline  100 mg Oral Daily    LORazepam  1 mg Oral BID    cetirizine  10 mg Oral Daily    busPIRone  15 mg Oral BID    amoxicillin-clavulanate  1 tablet Oral 2 times per day    budesonide  0.5 mg Nebulization BID    Arformoterol Tartrate  15 mcg Nebulization BID      dextrose 75 mL/hr at 01/10/21 1459     HYDROcodone 5 mg - acetaminophen, ibuprofen, albuterol    Allergies:  Patient has no known allergies.     Social History:    Social History     Socioeconomic History    Marital status:      Spouse name: Not on file    Number of children: 2    Years of education: 22    Highest education level: Not on file   Occupational History    Not on file   Social Needs    Financial resource strain: Not on file    Food insecurity     Worry: Not on file     Inability: Not on file    Transportation needs     Medical: Not on file     Non-medical: Not on file   Tobacco Use    Smoking status: Never Smoker    Smokeless tobacco: Never Used   Substance and Sexual Activity    Alcohol use: No    Drug use: No    Sexual activity: Not on file   Lifestyle    Physical activity     Days per week: Not on file     Minutes per session: Not on file    Stress: Not on file   Relationships    Social connections     Talks on phone: Not on file     Gets together: Not on file     Attends Islam service: Not on file     Active member of club or organization: Not on file     Attends meetings of clubs or organizations: Not on file     Relationship status: Not on file    Intimate partner violence     Fear of current or ex partner: Not on file     Emotionally abused: Not on file     Physically abused: Not on file     Forced sexual activity: Not on file   Other Topics Concern    Not on file   Social History Narrative    Not on file     Family History:       Problem Relation Age of Onset    Cancer Father     Cancer Sister         brain neoplasm     PHYSICAL EXAM:      Xr Foot Right (min 3 Views)    Result Date: 1/8/2021  EXAMINATION: THREE XRAY VIEWS OF THE RIGHT FOOT 1/8/2021 3:41 pm COMPARISON: None HISTORY: ORDERING SYSTEM PROVIDED HISTORY: Recent bunionectomy and now wound has opened and infected TECHNOLOGIST PROVIDED HISTORY: Reason for exam:->Recent bunionectomy and now wound has opened and infected FINDINGS: Postoperative changes related to osteotomies of the distal 1st and 5th metatarsals and proximal 1st proximal phalanx with associated internal fixation, demonstrating expected hardware positioning. No acute fractures nor areas of abnormal cortical disruption. Joints maintain anatomic alignment. Minimal degenerative changes of the 1st metatarsophalangeal joint. Soft tissue swelling in the forefoot most prominent dorsally. No evident skin defect nor soft tissue gas. 1. Expected postoperative changes related to repairs of right bunion and bunionette deformities. 2. Soft tissue swelling in the right forefoot is most prominent dorsally and could be postoperative but could also be related to cellulitis. No findings suggestive of necrotizing fasciitis or osteomyelitis. 3. Minimal osteoarthritic changes of the right 1st metatarsophalangeal joint. Vitals:    BP (!) 109/53   Pulse 88   Temp 98.3 °F (36.8 °C) (Oral)   Resp 16   Ht 5' (1.524 m)   Wt 136 lb 9.6 oz (62 kg)   SpO2 98%   BMI 26.68 kg/m²     Derm: Normal turgor and texture. No signs of skin lesions or rashes. Healed incision scars left foot. There is lateral incision which is healing along the 5th mpj with some eschar. No signs of infection. There is some dehiscence noted along the medial right 1st mpj with dry eschar noted. No erythema noted. No signs of abscess. No fluctuance. NEUROLOGIC: Sensation is intact to b/l feet. VASCULAR: Palpable dp and pt pulses b/l. No signs of calf pain or dvt. Some mild swelling right forefoot. MUSCULOSKELETAL: 5/5 muscle strength b/l. ROM intact to ankles b/l. There is mild cavus deformity noted b/l feet. Reviewed xrays, the right foot hardware is intact to the hallux, 1st metatarsal and 5th metatarsal.  No signs of osteomyelitis noted. No gas noted in the tissues.      Wound Care Documentation:  Wound 01/08/21 Other (Comment) Anterior;Right;Lateral surgical incision with no drainage (Active)   Dressing Status Other (Comment) 01/10/21 0815   Dressing/Treatment Other (comment) 01/10/21 0815   Wound Length (cm) 3 cm 01/08/21 2020   Wound Width (cm) 0.2 cm 01/08/21 2020   Wound Depth (cm) 0.1 cm 01/08/21 2020   Wound Surface Area (cm^2) 0.6 cm^2 01/08/21 2020   Wound Volume (cm^3) 0.06 cm^3 01/08/21 2020   Wound Assessment Dry;Purple/elizabeth 01/10/21 0815   Drainage Amount None 01/10/21 0815   Odor None 01/10/21 0815   Debbie-wound Assessment Intact 01/10/21 0815   Number of days: 1       Wound 01/08/21 Other (Comment) Anterior;Right;Medial surgical incision with no drainage (Active)   Dressing Status Other (Comment) 01/10/21 0815   Dressing/Treatment Other (comment) 01/10/21 0815   Wound Length (cm) 6.5 cm 01/08/21 2020   Wound Width (cm) 0.3 cm 01/08/21 2020   Wound Depth (cm) 0.1 cm 01/08/21 2020   Wound Surface Area (cm^2) 1.95 cm^2 01/08/21 2020   Wound Volume (cm^3) 0.2 cm^3 01/08/21 2020   Wound Assessment Dry;Purple/maroon 01/10/21 0815   Drainage Amount None 01/10/21 0815   Debbie-wound Assessment Intact 01/10/21 0815   Number of days: 1       Laboratory:    CBC:   Lab Results   Component Value Date    WBC 6.9 01/10/2021    RBC 3.60 01/10/2021    HGB 11.3 01/10/2021    HCT 34.6 01/10/2021    MCV 96.1 01/10/2021    MCH 31.4 01/10/2021    MCHC 32.7 01/10/2021    RDW 14.1 01/10/2021     01/10/2021    MPV 8.9 01/10/2021     CBC with Differential:    Lab Results   Component Value Date    WBC 6.9 01/10/2021    RBC 3.60 01/10/2021    HGB 11.3 01/10/2021    HCT 34.6 01/10/2021     01/10/2021    MCV 96.1 01/10/2021    MCH 31.4 01/10/2021    MCHC 32.7 01/10/2021    RDW 14.1 01/10/2021    SEGSPCT 92 01/21/2012    LYMPHOPCT 21.7 01/08/2021    MONOPCT 7.5 01/08/2021    BASOPCT 0.8 01/08/2021    MONOSABS 0.74 01/08/2021    LYMPHSABS 2.14 01/08/2021    EOSABS 0.23 01/08/2021    BASOSABS 0.08 01/08/2021     WBC:    Lab Results   Component Value Date    WBC 6.9 01/10/2021     Hemoglobin/Hematocrit:    Lab Results   Component Value Date    HGB 11.3 01/10/2021    HCT 34.6 01/10/2021     CMP:    Lab Results   Component Value Date     01/10/2021    K 4.3 01/10/2021    K 3.8 01/08/2021     01/10/2021    CO2 26 01/10/2021    BUN 12 01/10/2021    CREATININE 0.7 01/10/2021    GFRAA >60 01/10/2021 LABGLOM >60 01/10/2021    GLUCOSE 96 01/10/2021    GLUCOSE 114 01/21/2012    PROT 6.5 01/10/2021    LABALBU 3.7 01/10/2021    LABALBU 4.0 01/21/2012    CALCIUM 8.8 01/10/2021    BILITOT <0.2 01/10/2021    ALKPHOS 67 01/10/2021    AST 17 01/10/2021    ALT 15 01/10/2021     BMP:    Lab Results   Component Value Date     01/10/2021    K 4.3 01/10/2021    K 3.8 01/08/2021     01/10/2021    CO2 26 01/10/2021    BUN 12 01/10/2021    LABALBU 3.7 01/10/2021    LABALBU 4.0 01/21/2012    CREATININE 0.7 01/10/2021    CALCIUM 8.8 01/10/2021    GFRAA >60 01/10/2021    LABGLOM >60 01/10/2021    GLUCOSE 96 01/10/2021    GLUCOSE 114 01/21/2012     BUN/Creatinine:    Lab Results   Component Value Date    BUN 12 01/10/2021    CREATININE 0.7 01/10/2021     Uric Acid:  No results found for: URICACID  PT/INR:  No results found for: PROTIME, INR  Warfarin PT/INR:  No results found for: PROTIME, INR, WARFARIN  PTT:  No results found for: APTT, PTT[APTT}  U/A:    Lab Results   Component Value Date    NITRU Negative 01/08/2021    COLORU Yellow 01/08/2021    PHUR 5.5 01/08/2021    WBCUA NONE 01/08/2021    RBCUA 1-3 01/08/2021    BACTERIA NONE SEEN 01/08/2021    CLARITYU Clear 01/08/2021    SPECGRAV 1.015 01/08/2021    LEUKOCYTESUR Negative 01/08/2021    UROBILINOGEN 0.2 01/08/2021    BILIRUBINUR Negative 01/08/2021    BLOODU SMALL 01/08/2021    GLUCOSEU Negative 01/08/2021    KETUA TRACE 01/08/2021     HgBA1c:  No results found for: LABA1C  Urine Toxicology:    Lab Results   Component Value Date    LABBENZ NOT DETECTED 05/24/2017     24 Hour Urine for Protein:  No results found for: UTV, HOURSC, URINEVOLUME  24 Hour Urine for Creatinine Clearance:  No results found for: PZIOEOO69, HOURSC, UTV   Xr Foot Right (min 3 Views)    Result Date: 1/8/2021  EXAMINATION: THREE XRAY VIEWS OF THE RIGHT FOOT 1/8/2021 3:41 pm COMPARISON: None HISTORY: ORDERING SYSTEM PROVIDED HISTORY: Recent bunionectomy and now wound has opened and infected TECHNOLOGIST PROVIDED HISTORY: Reason for exam:->Recent bunionectomy and now wound has opened and infected FINDINGS: Postoperative changes related to osteotomies of the distal 1st and 5th metatarsals and proximal 1st proximal phalanx with associated internal fixation, demonstrating expected hardware positioning. No acute fractures nor areas of abnormal cortical disruption. Joints maintain anatomic alignment. Minimal degenerative changes of the 1st metatarsophalangeal joint. Soft tissue swelling in the forefoot most prominent dorsally. No evident skin defect nor soft tissue gas. 1. Expected postoperative changes related to repairs of right bunion and bunionette deformities. 2. Soft tissue swelling in the right forefoot is most prominent dorsally and could be postoperative but could also be related to cellulitis. No findings suggestive of necrotizing fasciitis or osteomyelitis. 3. Minimal osteoarthritic changes of the right 1st metatarsophalangeal joint. Assessment:  Principal Problem:    Surgical wound infection  Active Problems:    Hyponatremia    Moderate asthma    Anxiety  Resolved Problems:    * No resolved hospital problems. *  HAV b/l  S/P Right foot correction       Plan:  Exam  Reviewed chart and labs. Reviewed xrays. Discussed with patient further treatment options and risks and benefits. The incision looks much better than the initial picture she showed me. Applied dry dressing. Patient to keep the foot dry and clean. Change the dressing once a day as needed. Patient stable for d/c per podiatry. Patient to follow up in with Dr. Charis Blanchard. Thank you for allowing me to evaluate and treat your patient.       Electronically signed by Karolina Lee DPM on 1/10/2021 at 3:14 PM

## 2021-01-10 NOTE — PROGRESS NOTES
[facility] MD Miguel James MD Vanna Marine, MD Ricarda Servant, DO, ALI [facility] MD .  Progress note  Patient's Name: Jolynn Lefort  4:18 PM  1/10/2021    Na 134   Possible d/c today       History of Present Ilness:      44 y.o. old female who presents to the emergency department by private vehicle, for evaluation of surgical incision located on right medial foot    Nephrology are asked to see for hyponateremia . Her Na on presentation was 123 . This am Na up to 134   She is telling me she had no N/V/D she reports no alcohol intake she report no illicit drug intake   Past Medical History:   Diagnosis Date    Anxiety     Asthma     Chronic back pain greater than 3 months duration     Concussion     Pneumonia        Past Surgical History:   Procedure Laterality Date    ACHILLES TENDON SURGERY Left 2020    legnthenting    BUNIONECTOMY Bilateral 2020     SECTION         Family History   Problem Relation Age of Onset    Cancer Father     Cancer Sister         brain neoplasm        reports that she has never smoked. She has never used smokeless tobacco. She reports that she does not drink alcohol or use drugs. Allergies:  Patient has no known allergies.     Current Medications:        clindamycin (CLEOCIN) 600 mg in dextrose 5 % 50 mL IVPB, Q8H      heparin (porcine) injection 5,000 Units, BID      HYDROcodone-acetaminophen (NORCO) 5-325 MG per tablet 1 tablet, Q6H PRN      sertraline (ZOLOFT) tablet 100 mg, Daily      LORazepam (ATIVAN) tablet 1 mg, BID      ibuprofen (ADVIL;MOTRIN) tablet 800 mg, Q6H PRN      cetirizine (ZYRTEC) tablet 10 mg, Daily      busPIRone (BUSPAR) tablet 15 mg, BID      amoxicillin-clavulanate (AUGMENTIN) 875-125 MG per tablet 1 tablet, 2 times per day      albuterol (PROVENTIL) nebulizer solution 2.5 mg, Q6H PRN      budesonide (PULMICORT) nebulizer suspension 500 mcg, BID      Arformoterol Tartrate (BROVANA) nebulizer solution 15 mcg, BID        Review of Systems:   Constitutional: no fevers , no chills , feels ok   Eyes: no eye pain , no itching , no drainage  Ears, nose, mouth, throat, and face: no ear ,nose pain , hearing is ok ,no nasal drainage   Respiratory: no sob ,no cough ,no wheezing . Cardiovascular: no chest pain , no palpitation ,no sob . Gastrointestinal: no nausea, vomiting , constipation , no abdominal pain . Genitourinary:no urinary retention , no burning , dysuria . No polyuria   Hematologic/lymphatic: no bleeding , no cougulation issues . Musculoskeletal:no joint pain , no swelling . Neurological: no headaches ,no weakness , no numbness . Endocrine: no thirst , no weight issues . Physical exam:   Vital signs BP (!) 109/53   Pulse 88   Temp 98.3 °F (36.8 °C) (Oral)   Resp 16   Ht 5' (1.524 m)   Wt 136 lb 9.6 oz (62 kg)   SpO2 98%   BMI 26.68 kg/m²   Gen : NAD , appropriate for stated age . Head : at , nc   Neck : supple , no thyromegaly noted . Eyes : EOMI , PERRLA   CV : RRR , No M/R/G . Lungs: CTAB , no wheezing , good flow heard b/l   Abd : soft , NT , BS + , No Organomegaly appreciated . Skin : soft, dry . Neuro : CN  II-XII grossly intact , no focal neurologic deficit . Psych : cooperative .      Data:   Labs:  CBC with Differential:    Lab Results   Component Value Date    WBC 6.9 01/10/2021    RBC 3.60 01/10/2021    HGB 11.3 01/10/2021    HCT 34.6 01/10/2021     01/10/2021    MCV 96.1 01/10/2021    MCH 31.4 01/10/2021    MCHC 32.7 01/10/2021    RDW 14.1 01/10/2021    SEGSPCT 92 01/21/2012    LYMPHOPCT 21.7 01/08/2021    MONOPCT 7.5 01/08/2021    BASOPCT 0.8 01/08/2021    MONOSABS 0.74 01/08/2021    LYMPHSABS 2.14 01/08/2021    EOSABS 0.23 01/08/2021    BASOSABS 0.08 01/08/2021     CMP:    Lab Results   Component Value Date     01/10/2021    K 4.3 01/10/2021    K 3.8 01/08/2021     01/10/2021    CO2 26 01/10/2021    BUN 12 01/10/2021    CREATININE 0.7 01/10/2021    GFRAA >60 01/10/2021    LABGLOM >60 01/10/2021    GLUCOSE 96 01/10/2021    GLUCOSE 114 01/21/2012    PROT 6.5 01/10/2021    LABALBU 3.7 01/10/2021    LABALBU 4.0 01/21/2012    CALCIUM 8.8 01/10/2021    BILITOT <0.2 01/10/2021    ALKPHOS 67 01/10/2021    AST 17 01/10/2021    ALT 15 01/10/2021     Ionized Calcium:  No results found for: IONCA  Magnesium:  No results found for: MG  Phosphorus:  No results found for: PHOS  U/A:    Lab Results   Component Value Date    COLORU Yellow 01/08/2021    PHUR 5.5 01/08/2021    WBCUA NONE 01/08/2021    RBCUA 1-3 01/08/2021    BACTERIA NONE SEEN 01/08/2021    CLARITYU Clear 01/08/2021    SPECGRAV 1.015 01/08/2021    LEUKOCYTESUR Negative 01/08/2021    UROBILINOGEN 0.2 01/08/2021    BILIRUBINUR Negative 01/08/2021    BLOODU SMALL 01/08/2021    GLUCOSEU Negative 01/08/2021     Microalbumen/Creatinine ratio:  No components found for: RUCREAT  Iron Saturation:  No components found for: PERCENTFE  TIBC:  No results found for: TIBC  FERRITIN:  No results found for: FERRITIN     Imaging:  XR FOOT RIGHT (MIN 3 VIEWS)   Final Result   1. Expected postoperative changes related to repairs of right bunion and   bunionette deformities. 2. Soft tissue swelling in the right forefoot is most prominent dorsally and   could be postoperative but could also be related to cellulitis. No findings   suggestive of necrotizing fasciitis or osteomyelitis. 3. Minimal osteoarthritic changes of the right 1st metatarsophalangeal joint. Assessment    Hyponateremia euvolemic : Resolved     Etiology of hyponateremia unknown . We have one time Na of 123 then the next one was 134 , so this simply could a lab error . The other possibilty is acute SIADH that corrected it self rapidly (supported by high urine osmolarity of 400 ). Rate of NA correction over the last 24-48 hours has been appropriate .    Will stop d5w   F/u PRN        Electronically signed by Mariella Coates MD on 1/10/2021 at 4:18 PM

## 2021-01-10 NOTE — CONSULTS
[facility] MD Kya Khan MD Dominic Baptise, MD Merrilee Rast, DO, Abby Gomez MD .  Consultation  Patient's Name: Niurka Cruz  11:36 PM  2021    Nephrologist: Angelo Field MD    Reason for Consult:  Hyponateremia     Requesting Physician:  Elvira Muro MD    Chief Complaint:  Leg wound infection . History Obtained From:  Patient, records ,staff     History of Present Ilness:      44 y.o. old female who presents to the emergency department by private vehicle, for evaluation of surgical incision located on right medial foot    Nephrology are asked to see for hyponateremia . Her Na on presentation was 123 . This am Na up to 134   She is telling me she had no N/V/D she reports no alcohol intake she report no illicit drug intake   Past Medical History:   Diagnosis Date    Anxiety     Asthma     Chronic back pain greater than 3 months duration     Concussion     Pneumonia        Past Surgical History:   Procedure Laterality Date    ACHILLES TENDON SURGERY Left 2020    legnthenting    BUNIONECTOMY Bilateral 2020     SECTION         Family History   Problem Relation Age of Onset    Cancer Father     Cancer Sister         brain neoplasm        reports that she has never smoked. She has never used smokeless tobacco. She reports that she does not drink alcohol or use drugs. Allergies:  Patient has no known allergies.     Current Medications:        clindamycin (CLEOCIN) 600 mg in dextrose 5 % 50 mL IVPB, Q8H      dextrose 5 % solution, Continuous      heparin (porcine) injection 5,000 Units, BID      HYDROcodone-acetaminophen (NORCO) 5-325 MG per tablet 1 tablet, Q6H PRN      sertraline (ZOLOFT) tablet 100 mg, Daily      LORazepam (ATIVAN) tablet 1 mg, BID      ibuprofen (ADVIL;MOTRIN) tablet 800 mg, Q6H PRN      cetirizine (ZYRTEC) tablet 10 mg, Daily      busPIRone (BUSPAR) tablet 15 mg, BID     amoxicillin-clavulanate (AUGMENTIN) 875-125 MG per tablet 1 tablet, 2 times per day      albuterol (PROVENTIL) nebulizer solution 2.5 mg, Q6H PRN      budesonide (PULMICORT) nebulizer suspension 500 mcg, BID      Arformoterol Tartrate (BROVANA) nebulizer solution 15 mcg, BID        Review of Systems:   Constitutional: no fevers , no chills , feels ok   Eyes: no eye pain , no itching , no drainage  Ears, nose, mouth, throat, and face: no ear ,nose pain , hearing is ok ,no nasal drainage   Respiratory: no sob ,no cough ,no wheezing . Cardiovascular: no chest pain , no palpitation ,no sob . Gastrointestinal: no nausea, vomiting , constipation , no abdominal pain . Genitourinary:no urinary retention , no burning , dysuria . No polyuria   Hematologic/lymphatic: no bleeding , no cougulation issues . Musculoskeletal:no joint pain , no swelling . Neurological: no headaches ,no weakness , no numbness . Endocrine: no thirst , no weight issues . Physical exam:   Vital signs BP (!) 94/57   Pulse 88   Temp 98.7 °F (37.1 °C) (Oral)   Resp 14   Ht 5' (1.524 m)   Wt 133 lb (60.3 kg)   SpO2 100%   BMI 25.97 kg/m²   Gen : NAD , appropriate for stated age . Head : at , nc   Neck : supple , no thyromegaly noted . Eyes : EOMI , PERRLA   CV : RRR , No M/R/G . Lungs: CTAB , no wheezing , good flow heard b/l   Abd : soft , NT , BS + , No Organomegaly appreciated . Skin : soft, dry . Neuro : CN  II-XII grossly intact , no focal neurologic deficit . Psych : cooperative .      Data:   Labs:  CBC with Differential:    Lab Results   Component Value Date    WBC 9.6 01/09/2021    RBC 3.70 01/09/2021    HGB 11.5 01/09/2021    HCT 35.1 01/09/2021     01/09/2021    MCV 94.9 01/09/2021    MCH 31.1 01/09/2021    MCHC 32.8 01/09/2021    RDW 13.7 01/09/2021    SEGSPCT 92 01/21/2012    LYMPHOPCT 21.7 01/08/2021    MONOPCT 7.5 01/08/2021    BASOPCT 0.8 01/08/2021    MONOSABS 0.74 01/08/2021    LYMPHSABS 2.14 01/08/2021    EOSABS 0.23 01/08/2021    BASOSABS 0.08 01/08/2021     CMP:    Lab Results   Component Value Date     01/09/2021    K 3.9 01/09/2021    K 3.8 01/08/2021     01/09/2021    CO2 24 01/09/2021    BUN 8 01/09/2021    CREATININE 0.5 01/09/2021    GFRAA >60 01/09/2021    LABGLOM >60 01/09/2021    GLUCOSE 102 01/09/2021    GLUCOSE 114 01/21/2012    PROT 6.6 01/09/2021    LABALBU 3.8 01/09/2021    LABALBU 4.0 01/21/2012    CALCIUM 9.0 01/09/2021    BILITOT 0.4 01/09/2021    ALKPHOS 70 01/09/2021    AST 21 01/09/2021    ALT 19 01/09/2021     Ionized Calcium:  No results found for: IONCA  Magnesium:  No results found for: MG  Phosphorus:  No results found for: PHOS  U/A:    Lab Results   Component Value Date    COLORU Yellow 01/08/2021    PHUR 5.5 01/08/2021    WBCUA NONE 01/08/2021    RBCUA 1-3 01/08/2021    BACTERIA NONE SEEN 01/08/2021    CLARITYU Clear 01/08/2021    SPECGRAV 1.015 01/08/2021    LEUKOCYTESUR Negative 01/08/2021    UROBILINOGEN 0.2 01/08/2021    BILIRUBINUR Negative 01/08/2021    BLOODU SMALL 01/08/2021    GLUCOSEU Negative 01/08/2021     Microalbumen/Creatinine ratio:  No components found for: RUCREAT  Iron Saturation:  No components found for: PERCENTFE  TIBC:  No results found for: TIBC  FERRITIN:  No results found for: FERRITIN     Imaging:  XR FOOT RIGHT (MIN 3 VIEWS)   Final Result   1. Expected postoperative changes related to repairs of right bunion and   bunionette deformities. 2. Soft tissue swelling in the right forefoot is most prominent dorsally and   could be postoperative but could also be related to cellulitis. No findings   suggestive of necrotizing fasciitis or osteomyelitis. 3. Minimal osteoarthritic changes of the right 1st metatarsophalangeal joint. Assessment    Hyponateremia euvolemic : Resolved     Etiology of hyponateremia unknown . We have one time Na of 123 then the next one was 134 , so this simply could a lab error .    The other possibilty

## 2021-01-10 NOTE — PROGRESS NOTES
Patient does not want to be discharged until seen by wound care nurse. Dr. Audi Brock aware and he made Dr. Josse Ibrahim aware. Podiatry also consulted.   Electronically signed by Ajit Varghese RN on 1/10/2021 at 5:47 PM

## 2021-01-10 NOTE — CONSULTS
[facility]     Consult Note                                 [address] [facility] [address], [place], [address]                   Phone [phone]     Fax [phone]        Date:   2021  Patient name:  Jacelyn Phoenix  Date of admission:  2021  3:29 PM  MRN:   [id number]  YOB: 1970    Reason for Consult:  Rt foot infection    CC:   Chief Complaint   Patient presents with    Wound Check     had bunionectomy  and is infected. on augmentin, but getting worse        HISTORY OF PRESENT ILLNESS:                The patient is a 44 y.o. female who underwent bunionectomy of rt foot on  at [facility] clinic by ortho and everything went well but few days later she noted more drainage and was given Augmentin by them but it continue dto get worse according to her and her relative who works at wound center told her to go to the hospital immediately  No fever/chills  Her ESR is low  She has been started on clindamycin       Past Medical History:   has a past medical history of Anxiety, Asthma, Chronic back pain greater than 3 months duration, Concussion, and Pneumonia. Past Surgical History:   has a past surgical history that includes  section; Bunionectomy (Bilateral, 2020); and Achilles tendon surgery (Left, 2020). Home Medications:    Prior to Admission medications    Medication Sig Start Date End Date Taking? Authorizing Provider   fluticasone-salmeterol (ADVAIR) 500-50 MCG/DOSE diskus inhaler Inhale 1 puff into the lungs every 12 hours   Yes Historical Provider, MD   sertraline (ZOLOFT) 100 MG tablet Take 100 mg by mouth daily At night   Yes Historical Provider, MD   busPIRone (BUSPAR) 15 MG tablet Take 15 mg by mouth 2 times daily   Yes Historical Provider, MD   LORazepam (ATIVAN) 1 MG tablet Take 1 mg by mouth 2 times daily.    Yes Historical Provider, MD HYDROcodone-acetaminophen (NORCO) 5-325 MG per tablet Take 1 tablet by mouth every 6 hours as needed for Pain. Yes Historical Provider, MD   amoxicillin-clavulanate (AUGMENTIN) 875-125 MG per tablet Take 1 tablet by mouth 2 times daily   Yes Historical Provider, MD   cetirizine (ZYRTEC) 10 MG tablet Take 10 mg by mouth daily   Yes Historical Provider, MD   albuterol sulfate  (90 BASE) MCG/ACT inhaler Inhale 2 puffs into the lungs every 6 hours as needed for Wheezing   Yes Historical Provider, MD   ibuprofen (ADVIL;MOTRIN) 800 MG tablet Take 800 mg by mouth every 6 hours as needed for Pain   Yes Historical Provider, MD       Allergies:  Patient has no known allergies. Social History:   reports that she has never smoked. She has never used smokeless tobacco. She reports that she does not drink alcohol or use drugs. Family History: family history includes Cancer in her father and sister. REVIEW OF SYSTEMS:    12 point ROS has been done and pertinent neg and positive has been included in HPI and rest are non contributory        PHYSICAL EXAM:    BP (!) 94/57   Pulse 88   Temp 98.7 °F (37.1 °C) (Oral)   Resp 14   Ht 5' (1.524 m)   Wt 133 lb (60.3 kg)   SpO2 100%   BMI 25.97 kg/m²    VENT SETTINGS:   Vent Information  SpO2: 100 %    General appearance: Alert, Awake, Oriented times 3, no distress  Skin: Warm and dry  Eyes: Pink palpebral conjunctivae. PERRL  Ears: External ears normal.  Nose/Sinuses: Nares normal. Septum midline. Mucosa normal. No sinus tenderness. Oropharynx: Oropharynx clear with no exudates seen  Neck: Neck supple. No jugular venous distension, lymphadenopathy or thyromegaly Trachea midline  Lungs: Lungs clear to auscultation bilaterally. No rhonchi, crackles or wheezes  Heart: S1 S2  Regular rate and rhythm. No rub, murmur or gallop  Abdomen: Abdomen soft, non-tender.  BS normal. No masses, organomegaly  Extremities: No edema, Peripheral pulses palpable  Musculoskeletal: left leg incision site on medial site is not completely healed, steri strips in place, no drainage, no foul smell, no surrounding tenderness or redness  Lateral site healing well    DATA:    Labs:     Last 3 CBC:  Recent Labs     01/08/21  1620 01/09/21  0425   WBC 9.9 9.6   RBC 4.04 3.70   HGB 12.8 11.5   * 446   MPV 9.0 8.8       Last 3 BMP  Recent Labs     01/08/21  1620 01/09/21  0425   * 134   K 3.8 3.9   CL 90* 102   CO2 24 24   BUN 10 8   CREATININE 0.7 0.5   GLUCOSE 98 102*   CALCIUM 9.3 9.0       LIVER PROFILE:  Recent Labs     01/08/21  1620 01/09/21  0425   AST 22 21   ALT 22 19   LABALBU 4.4 3.8       Microbiology :  No results for input(s): BC in the last 72 hours. No results for input(s): [place] in the last 72 hours. No results for input(s): LABURIN in the last 72 hours. No results for input(s): CULTRESP in the last 72 hours. Recent Labs     01/08/21  1850   WNDABS Growth not present, incubation continues         Radiology :  XR FOOT RIGHT (MIN 3 VIEWS)   Final Result   1. Expected postoperative changes related to repairs of right bunion and   bunionette deformities. 2. Soft tissue swelling in the right forefoot is most prominent dorsally and   could be postoperative but could also be related to cellulitis. No findings   suggestive of necrotizing fasciitis or osteomyelitis. 3. Minimal osteoarthritic changes of the right 1st metatarsophalangeal joint.               Assessment and Plan:      · Left foot bunionectomy site dehiscence (medial aspect)    Plan  - getting augmentin and clindamycin, clinically it working well and I'm ok in letting her go on PO abx   - She wan ts to have a 'wound care'/poduatry opinion first                  Arthur Rodriguez MD

## 2021-01-11 LAB
ALBUMIN SERPL-MCNC: 3.7 G/DL (ref 3.5–5.2)
ALP BLD-CCNC: 66 U/L (ref 35–104)
ALT SERPL-CCNC: 15 U/L (ref 0–32)
ANION GAP SERPL CALCULATED.3IONS-SCNC: 8 MMOL/L (ref 7–16)
AST SERPL-CCNC: 17 U/L (ref 0–31)
BILIRUB SERPL-MCNC: <0.2 MG/DL (ref 0–1.2)
BUN BLDV-MCNC: 11 MG/DL (ref 6–20)
CALCIUM SERPL-MCNC: 9 MG/DL (ref 8.6–10.2)
CHLORIDE BLD-SCNC: 101 MMOL/L (ref 98–107)
CO2: 26 MMOL/L (ref 22–29)
CREAT SERPL-MCNC: 0.6 MG/DL (ref 0.5–1)
GFR AFRICAN AMERICAN: >60
GFR NON-AFRICAN AMERICAN: >60 ML/MIN/1.73
GLUCOSE BLD-MCNC: 114 MG/DL (ref 74–99)
HCT VFR BLD CALC: 35 % (ref 34–48)
HEMOGLOBIN: 11.2 G/DL (ref 11.5–15.5)
MCH RBC QN AUTO: 31 PG (ref 26–35)
MCHC RBC AUTO-ENTMCNC: 32 % (ref 32–34.5)
MCV RBC AUTO: 97 FL (ref 80–99.9)
PDW BLD-RTO: 13.7 FL (ref 11.5–15)
PLATELET # BLD: 461 E9/L (ref 130–450)
PMV BLD AUTO: 8.8 FL (ref 7–12)
POTASSIUM SERPL-SCNC: 4.3 MMOL/L (ref 3.5–5)
RBC # BLD: 3.61 E12/L (ref 3.5–5.5)
SODIUM BLD-SCNC: 135 MMOL/L (ref 132–146)
TOTAL PROTEIN: 6.6 G/DL (ref 6.4–8.3)
WBC # BLD: 7.8 E9/L (ref 4.5–11.5)
WOUND/ABSCESS: NORMAL

## 2021-01-11 PROCEDURE — 6370000000 HC RX 637 (ALT 250 FOR IP): Performed by: INTERNAL MEDICINE

## 2021-01-11 PROCEDURE — 80053 COMPREHEN METABOLIC PANEL: CPT

## 2021-01-11 PROCEDURE — 2500000003 HC RX 250 WO HCPCS: Performed by: INTERNAL MEDICINE

## 2021-01-11 PROCEDURE — 1200000000 HC SEMI PRIVATE

## 2021-01-11 PROCEDURE — 85027 COMPLETE CBC AUTOMATED: CPT

## 2021-01-11 PROCEDURE — 36415 COLL VENOUS BLD VENIPUNCTURE: CPT

## 2021-01-11 RX ORDER — CLINDAMYCIN HYDROCHLORIDE 300 MG/1
300 CAPSULE ORAL 3 TIMES DAILY
Qty: 21 CAPSULE | Refills: 0 | OUTPATIENT
Start: 2021-01-11 | End: 2021-01-18

## 2021-01-11 RX ORDER — AMOXICILLIN AND CLAVULANATE POTASSIUM 875; 125 MG/1; MG/1
1 TABLET, FILM COATED ORAL 2 TIMES DAILY
Qty: 14 TABLET | Refills: 0 | OUTPATIENT
Start: 2021-01-11 | End: 2021-01-18

## 2021-01-11 RX ADMIN — COLLAGENASE SANTYL: 250 OINTMENT TOPICAL at 14:19

## 2021-01-11 RX ADMIN — CLINDAMYCIN PHOSPHATE 600 MG: 600 INJECTION, SOLUTION INTRAVENOUS at 16:50

## 2021-01-11 RX ADMIN — HYDROCODONE BITARTRATE AND ACETAMINOPHEN 1 TABLET: 5; 325 TABLET ORAL at 04:11

## 2021-01-11 RX ADMIN — AMOXICILLIN AND CLAVULANATE POTASSIUM 1 TABLET: 875; 125 TABLET, FILM COATED ORAL at 09:11

## 2021-01-11 RX ADMIN — HYDROCODONE BITARTRATE AND ACETAMINOPHEN 1 TABLET: 5; 325 TABLET ORAL at 12:30

## 2021-01-11 RX ADMIN — LORAZEPAM 1 MG: 1 TABLET ORAL at 20:22

## 2021-01-11 RX ADMIN — CETIRIZINE HYDROCHLORIDE 10 MG: 10 TABLET, FILM COATED ORAL at 09:11

## 2021-01-11 RX ADMIN — HYDROCODONE BITARTRATE AND ACETAMINOPHEN 1 TABLET: 5; 325 TABLET ORAL at 19:18

## 2021-01-11 RX ADMIN — SERTRALINE 100 MG: 100 TABLET, FILM COATED ORAL at 09:11

## 2021-01-11 RX ADMIN — AMOXICILLIN AND CLAVULANATE POTASSIUM 1 TABLET: 875; 125 TABLET, FILM COATED ORAL at 20:30

## 2021-01-11 RX ADMIN — LORAZEPAM 1 MG: 1 TABLET ORAL at 09:11

## 2021-01-11 RX ADMIN — BUSPIRONE HYDROCHLORIDE 15 MG: 5 TABLET ORAL at 20:22

## 2021-01-11 RX ADMIN — CLINDAMYCIN PHOSPHATE 600 MG: 600 INJECTION, SOLUTION INTRAVENOUS at 09:11

## 2021-01-11 RX ADMIN — BUSPIRONE HYDROCHLORIDE 15 MG: 5 TABLET ORAL at 09:11

## 2021-01-11 ASSESSMENT — PAIN DESCRIPTION - FREQUENCY
FREQUENCY: CONTINUOUS
FREQUENCY: CONTINUOUS

## 2021-01-11 ASSESSMENT — PAIN SCALES - GENERAL
PAINLEVEL_OUTOF10: 0
PAINLEVEL_OUTOF10: 0
PAINLEVEL_OUTOF10: 6
PAINLEVEL_OUTOF10: 0

## 2021-01-11 ASSESSMENT — PAIN DESCRIPTION - ONSET: ONSET: ON-GOING

## 2021-01-11 ASSESSMENT — PAIN DESCRIPTION - LOCATION: LOCATION: FOOT

## 2021-01-11 ASSESSMENT — PAIN DESCRIPTION - ORIENTATION
ORIENTATION: RIGHT
ORIENTATION: RIGHT

## 2021-01-11 ASSESSMENT — PAIN DESCRIPTION - PROGRESSION: CLINICAL_PROGRESSION: NOT CHANGED

## 2021-01-11 ASSESSMENT — PAIN DESCRIPTION - DESCRIPTORS
DESCRIPTORS: ACHING
DESCRIPTORS: ACHING

## 2021-01-11 ASSESSMENT — PAIN DESCRIPTION - PAIN TYPE: TYPE: ACUTE PAIN

## 2021-01-11 NOTE — PROGRESS NOTES
Comprehensive Nutrition Assessment    Type and Reason for Visit:  Initial, Positive Nutrition Screen    Nutrition Recommendations/Plan: Continue current diet, Start Ensure HP BID, [place] BID    Nutrition Assessment:  Pt admit w/ R foot dehiscence s/p bunionectomy. Pt w/ increased nutrient needs for wound healing. Will add ONS and monitor. Malnutrition Assessment:  Malnutrition Status:  Insufficient data    Context:  Acute Illness     Findings of the 6 clinical characteristics of malnutrition:  Energy Intake:  No significant decrease in energy intake  Weight Loss:  Unable to assess     Body Fat Loss:  Unable to assess     Muscle Mass Loss:  Unable to assess    Fluid Accumulation:  No significant fluid accumulation     Strength:  Not Performed    Estimated Daily Nutrient Needs:  Energy (kcal):  6558-8725; Weight Used for Energy Requirements:  Current     Protein (g):  60-70; Weight Used for Protein Requirements:  Ideal(1.3-1.5)        Fluid (ml/day):  9291-4341; Method Used for Fluid Requirements:  1 ml/kcal      Nutrition Related Findings:  pt alert, active BS, soft abd, +1 edema, +I/Os      Wounds:  Surgical Incision       Current Nutrition Therapies:    DIET CARDIAC; Anthropometric Measures:  · Height: 5' (152.4 cm)  · Current Body Weight: 140 lb (63.5 kg)   · Admission Body Weight: 135 lb (61.2 kg)(1/8 actual)    · Usual Body Weight: (UTO d/t no actual wt hx on file)     · Ideal Body Weight: 100 lbs; % Ideal Body Weight 140 %   · BMI: 27.3  · BMI Categories: Overweight (BMI 25.0-29. 9)       Nutrition Diagnosis:   · Increased nutrient needs related to increase demand for energy/nutrients as evidenced by wounds    Nutrition Interventions:   Food and/or Nutrient Delivery:  Continue Current Diet, Start Oral Nutrition Supplement  Nutrition Education/Counseling:  Education not indicated   Coordination of Nutrition Care:  Continue to monitor while inpatient    Goals:  pt to consume >75% meals/ONS Nutrition Monitoring and Evaluation:   Food/Nutrient Intake Outcomes:  Food and Nutrient Intake, Supplement Intake  Physical Signs/Symptoms Outcomes:  Biochemical Data, GI Status, Fluid Status or Edema, Nutrition Focused Physical Findings, Skin, Weight     Discharge Planning:    Continue Oral Nutrition Supplement     Electronically signed by Daksha Gutiérrez MS, RD, LD on 1/11/21 at 1:45 PM EST    Contact: [phone]

## 2021-01-11 NOTE — PLAN OF CARE
Problem: Pain:  Goal: Pain level will decrease  Description: Pain level will decrease  1/11/2021 1016 by Taina Gamez RN  Outcome: Met This Shift     Problem: Pain:  Goal: Control of acute pain  Description: Control of acute pain  1/11/2021 1016 by Taina Gamez RN  Outcome: Met This Shift     Problem: Pain:  Goal: Control of chronic pain  Description: Control of chronic pain  1/11/2021 1016 by Taina Gamez RN  Outcome: Met This Shift     Problem: Skin Integrity:  Goal: Will show no infection signs and symptoms  Description: Will show no infection signs and symptoms  1/11/2021 1016 by Taina Gamez RN  Outcome: Met This Shift     Problem: Skin Integrity:  Goal: Absence of new skin breakdown  Description: Absence of new skin breakdown  1/11/2021 1016 by Taina Gamez RN  Outcome: Met This Shift

## 2021-01-11 NOTE — PROGRESS NOTES
Initial Inpatient Wound Care    Admit Date: 1/8/2021  3:29 PM    Reason for consult: Foot wound. insistent on seeing Wound Nurse  Significant history:  Bunion surgery, asthma, chronic back pain  anxiety    Wound history:  POA OR per Dr. Milan Mcguire. At [facility] 12/14  Findings:  Alert and oriented. Independent in care      Are dry, puffy around incision    Plan: Mitul  Will follow up with wound center  Patient requesting to be seen by wound nurse tomorrow for re-evaluation of progress on wound.  Will see if still in hospital    Fili Lopez 1/11/2021 11:47 AM

## 2021-01-11 NOTE — CARE COORDINATION
Social Work discharge planning   Sw spoke to pt for discharge planning. Pt said she only has \"Half a boot\" to walk with, as she said [facility]; Clinic lost the other half\". SW advised her to talk to her Podiatrist to see IF ordering a new or different boot is needed. She stated understanding. Sw notified charge RN Fahad Rubio. Pt said she anticipates going home once she sees wound care. Pt said \"Podiatry told me that wound care will see me\". Pt discharged by ID on 2 po atb. Pt said she uses [facility] in [facility]. Pt said she has PCP, Dr Kaye Rey. Pt denied other needs for discharge planning presently. Social Work to follow for support and discharge planning with CM.   Electronically signed by Ashley Rob on 1/11/2021 at 10:23 AM

## 2021-01-12 VITALS
WEIGHT: 139.9 LBS | HEIGHT: 60 IN | SYSTOLIC BLOOD PRESSURE: 107 MMHG | TEMPERATURE: 98.8 F | OXYGEN SATURATION: 96 % | BODY MASS INDEX: 27.47 KG/M2 | HEART RATE: 83 BPM | DIASTOLIC BLOOD PRESSURE: 51 MMHG | RESPIRATION RATE: 16 BRPM

## 2021-01-12 LAB
ALBUMIN SERPL-MCNC: 3.7 G/DL (ref 3.5–5.2)
ALP BLD-CCNC: 66 U/L (ref 35–104)
ALT SERPL-CCNC: 12 U/L (ref 0–32)
ANION GAP SERPL CALCULATED.3IONS-SCNC: 6 MMOL/L (ref 7–16)
AST SERPL-CCNC: 15 U/L (ref 0–31)
BILIRUB SERPL-MCNC: <0.2 MG/DL (ref 0–1.2)
BUN BLDV-MCNC: 11 MG/DL (ref 6–20)
CALCIUM SERPL-MCNC: 8.9 MG/DL (ref 8.6–10.2)
CHLORIDE BLD-SCNC: 101 MMOL/L (ref 98–107)
CO2: 27 MMOL/L (ref 22–29)
CREAT SERPL-MCNC: 0.6 MG/DL (ref 0.5–1)
GFR AFRICAN AMERICAN: >60
GFR NON-AFRICAN AMERICAN: >60 ML/MIN/1.73
GLUCOSE BLD-MCNC: 96 MG/DL (ref 74–99)
HCT VFR BLD CALC: 34.3 % (ref 34–48)
HEMOGLOBIN: 11 G/DL (ref 11.5–15.5)
MCH RBC QN AUTO: 31.2 PG (ref 26–35)
MCHC RBC AUTO-ENTMCNC: 32.1 % (ref 32–34.5)
MCV RBC AUTO: 97.2 FL (ref 80–99.9)
PDW BLD-RTO: 14.1 FL (ref 11.5–15)
PLATELET # BLD: 455 E9/L (ref 130–450)
PMV BLD AUTO: 8.8 FL (ref 7–12)
POTASSIUM SERPL-SCNC: 4.3 MMOL/L (ref 3.5–5)
RBC # BLD: 3.53 E12/L (ref 3.5–5.5)
SODIUM BLD-SCNC: 134 MMOL/L (ref 132–146)
TOTAL PROTEIN: 6.2 G/DL (ref 6.4–8.3)
WBC # BLD: 6.8 E9/L (ref 4.5–11.5)

## 2021-01-12 PROCEDURE — 2500000003 HC RX 250 WO HCPCS: Performed by: INTERNAL MEDICINE

## 2021-01-12 PROCEDURE — 6370000000 HC RX 637 (ALT 250 FOR IP): Performed by: INTERNAL MEDICINE

## 2021-01-12 PROCEDURE — 36415 COLL VENOUS BLD VENIPUNCTURE: CPT

## 2021-01-12 PROCEDURE — 85027 COMPLETE CBC AUTOMATED: CPT

## 2021-01-12 PROCEDURE — 80053 COMPREHEN METABOLIC PANEL: CPT

## 2021-01-12 PROCEDURE — 2580000003 HC RX 258: Performed by: INTERNAL MEDICINE

## 2021-01-12 RX ORDER — SODIUM CHLORIDE 0.9 % (FLUSH) 0.9 %
10 SYRINGE (ML) INJECTION 2 TIMES DAILY
Status: DISCONTINUED | OUTPATIENT
Start: 2021-01-12 | End: 2021-01-12 | Stop reason: HOSPADM

## 2021-01-12 RX ORDER — HYDROCODONE BITARTRATE AND ACETAMINOPHEN 5; 325 MG/1; MG/1
1 TABLET ORAL EVERY 6 HOURS PRN
Qty: 10 TABLET | Refills: 0 | Status: SHIPPED | OUTPATIENT
Start: 2021-01-12 | End: 2021-01-15

## 2021-01-12 RX ADMIN — BUSPIRONE HYDROCHLORIDE 15 MG: 5 TABLET ORAL at 08:42

## 2021-01-12 RX ADMIN — CLINDAMYCIN PHOSPHATE 600 MG: 600 INJECTION, SOLUTION INTRAVENOUS at 08:41

## 2021-01-12 RX ADMIN — SODIUM CHLORIDE, PRESERVATIVE FREE 10 ML: 5 INJECTION INTRAVENOUS at 08:44

## 2021-01-12 RX ADMIN — CLINDAMYCIN PHOSPHATE 600 MG: 600 INJECTION, SOLUTION INTRAVENOUS at 00:22

## 2021-01-12 RX ADMIN — LORAZEPAM 1 MG: 1 TABLET ORAL at 08:42

## 2021-01-12 RX ADMIN — SERTRALINE 100 MG: 100 TABLET, FILM COATED ORAL at 08:42

## 2021-01-12 RX ADMIN — HYDROCODONE BITARTRATE AND ACETAMINOPHEN 1 TABLET: 5; 325 TABLET ORAL at 05:00

## 2021-01-12 RX ADMIN — AMOXICILLIN AND CLAVULANATE POTASSIUM 1 TABLET: 875; 125 TABLET, FILM COATED ORAL at 08:41

## 2021-01-12 RX ADMIN — COLLAGENASE SANTYL: 250 OINTMENT TOPICAL at 08:46

## 2021-01-12 RX ADMIN — CETIRIZINE HYDROCHLORIDE 10 MG: 10 TABLET, FILM COATED ORAL at 08:42

## 2021-01-12 ASSESSMENT — PAIN DESCRIPTION - LOCATION: LOCATION: FOOT

## 2021-01-12 ASSESSMENT — PAIN DESCRIPTION - PAIN TYPE: TYPE: ACUTE PAIN

## 2021-01-12 ASSESSMENT — PAIN DESCRIPTION - DESCRIPTORS: DESCRIPTORS: ACHING;DISCOMFORT

## 2021-01-12 ASSESSMENT — PAIN SCALES - GENERAL: PAINLEVEL_OUTOF10: 0

## 2021-01-12 ASSESSMENT — PAIN DESCRIPTION - FREQUENCY: FREQUENCY: INTERMITTENT

## 2021-01-12 ASSESSMENT — PAIN DESCRIPTION - ORIENTATION: ORIENTATION: RIGHT

## 2021-01-12 NOTE — PATIENT CARE CONFERENCE
[facility] Quality Flow/Interdisciplinary Rounds Progress Note        Quality Flow Rounds held on January 12, 2021    Disciplines Attending:  Bedside Nurse, ,  and Nursing Unit Leadership    Carmen Monet was admitted on 1/8/2021  3:29 PM    Anticipated Discharge Date:  Expected Discharge Date: 01/11/21    Disposition:    Alireza Score:  Alireza Scale Score: 22    Readmission Risk              Risk of Unplanned Readmission:        7           Discussed patient goal for the day, patient clinical progression, and barriers to discharge.   The following Goal(s) of the Day/Commitment(s) have been identified:  Discharge - Obtain Order      Jody Danielson  January 12, 2021

## 2021-01-12 NOTE — PROGRESS NOTES
Internal Medicine  Progress Note  Milagros Wild MD     Subjective:     Patient is alert. Patient reports OK still with pain. Tolerating diet well no other c/o.     Scheduled Meds:   collagenase   Topical Daily    clindamycin (CLEOCIN) IV  600 mg Intravenous Q8H    heparin (porcine)  5,000 Units Subcutaneous BID    sertraline  100 mg Oral Daily    LORazepam  1 mg Oral BID    cetirizine  10 mg Oral Daily    busPIRone  15 mg Oral BID    amoxicillin-clavulanate  1 tablet Oral 2 times per day    budesonide  0.5 mg Nebulization BID    Arformoterol Tartrate  15 mcg Nebulization BID     Continuous Infusions:  PRN Meds:HYDROcodone 5 mg - acetaminophen, ibuprofen, albuterol    Objective:      Physical Exam:  Vitals:   BP (!) 104/52   Pulse 82   Temp 98.8 °F (37.1 °C) (Oral)   Resp 17   Ht 5' (1.524 m)   Wt 139 lb 14.4 oz (63.5 kg)   SpO2 96%   BMI 27.32 kg/m²   I/O's    Intake/Output Summary (Last 24 hours) at 1/12/2021 3942  Last data filed at 1/11/2021 1806  Gross per 24 hour   Intake 1080 ml   Output --   Net 1080 ml     Exam:  General appearance: alert, appears stated age and cooperative  Head: Normocephalic, without obvious abnormality, atraumatic  Eyes: negative  Throat: normal findings: lips normal without lesions  Neck: no adenopathy, no carotid bruit, no JVD and supple, symmetrical, trachea midline  Back: negative  Lungs: clear to auscultation bilaterally  Chest wall: no tenderness  Heart: regular rate and rhythm  Abdomen: soft, non-tender; bowel sounds normal; no masses,  no organomegaly  Extremities: extremities normal, atraumatic, no cyanosis or edema  Pulses: 2+ and symmetric  Skin: Skin color, texture, turgor normal. No rashes or lesions  Lymph nodes: Cervical, supraclavicular, and axillary nodes normal.  Neurologic: Grossly normal      Imaging     Chest  Xray:  Results for orders placed during the hospital encounter of 12/17/16   XR Chest Standard TWO VW    Narrative Patient MRN: [id number]  : 1970  Age: 48 years  Gender: Female    Order Date:  2016 11:28 PM    TECHNIQUE/NUMBER OF IMAGES/COMPARISON/CLINICAL HISTORY:    Chest PA and lateral views. 2 images, 2 views. COMPARISON: 2016. CLINICAL HISTORY: Shortness of breath. FINDINGS:   There is a mild S-shaped scoliotic curvature of the thoracolumbar  spine. Lungs are normally expanded. No infiltrates, consolidations or pleural  effusions are seen. The heart has normal Size and the mediastinum appears unremarkable. Impression 1. No acute cardiopulmonary process. Results for orders placed during the hospital encounter of 17   XR Chest Portable    Narrative Patient MRN:  [id number]  : 1970  Age: 48 years  Gender: Female    Order Date:  3/27/2017 7:32 PM    EXAM: XR CHEST PORTABLE    NUMBER OF IMAGES:  One, VIEWS:  One    INDICATION: Shortness of Breath     COMPARISON: Chest x-ray dated 2016    Findings:   The patient is rotated to the right. The lungs are without evidence of  acute infiltrate. The pulmonary vasculature and cardiomediastinal  silhouette appear within normal limits. Impression No evidence of acute pulmonary disease.          Additional Imaging:  none    Lab Review   Recent Results (from the past 24 hour(s))   CBC    Collection Time: 21  4:48 AM   Result Value Ref Range    WBC 6.8 4.5 - 11.5 E9/L    RBC 3.53 3.50 - 5.50 E12/L    Hemoglobin 11.0 (L) 11.5 - 15.5 g/dL    Hematocrit 34.3 34.0 - 48.0 %    MCV 97.2 80.0 - 99.9 fL    MCH 31.2 26.0 - 35.0 pg    MCHC 32.1 32.0 - 34.5 %    RDW 14.1 11.5 - 15.0 fL    Platelets 042 (H) 699 - 450 E9/L    MPV 8.8 7.0 - 12.0 fL   Comprehensive metabolic panel    Collection Time: 21  4:48 AM   Result Value Ref Range    Sodium 134 132 - 146 mmol/L    Potassium 4.3 3.5 - 5.0 mmol/L    Chloride 101 98 - 107 mmol/L    CO2 27 22 - 29 mmol/L    Anion Gap 6 (L) 7 - 16 mmol/L    Glucose 96 74 - 99 mg/dL    BUN 11 6 -

## 2021-01-13 LAB
ANAEROBIC CULTURE: NORMAL
BLOOD CULTURE, ROUTINE: NORMAL
CULTURE, BLOOD 2: NORMAL

## 2021-01-24 NOTE — DISCHARGE SUMMARY
Patient ID:  Jazmín Lo  [id number]  44 y.o.  1970    Admit date: 1/8/2021    Discharge date and time: 1/12/2021 10:24 AM     Admission Diagnoses: Hyponatremia [E87.1]    Discharge Diagnoses:   Patient Active Problem List   Diagnosis    Asthma with status asthmaticus    Depressive disorder    Hyponatremia    Surgical wound infection    Moderate asthma    Anxiety       Consults: ID, rehabilitation medicine, orthopedic surgery and wound care    Procedures:   none    Hospital Course:    Pt admitted with the following from H&P - The patient is a 44 y.o. female patient of mine who presents with drainage from foot wound - as per ER -   Tia Beard a 44 y.o. old female who presents to the emergency department by private vehicle, for evaluation of surgical incision located on right medial foot, which occured 12 day(s) prior to arrival. Tommie Sunshine had a bunionectomy on right foot on 12/28/2020. Sushant Horvathe a 1 week follow-up on Monday and when had Steri-Strips placed and was placed on Augmentin twice daily for 10 days. . The wound is / has open, tender and serosanguinous drainage.  She denies any fevers, chills, shortness breath, chest pain, erythema.  Surgery is employed at [facility] by Dr. Blanco Place    She was seen by multiple sources of multiple specialties all felt at this time the wound looked good    Discharge Exam:  See progress note from today    Disposition: home    Condition at Discharge:  stable    Patient Instructions:   Discharge Medication List as of 1/12/2021  9:59 AM      START taking these medications    Details   !! HYDROcodone-acetaminophen (NORCO) 5-325 MG per tablet Take 1 tablet by mouth every 6 hours as needed for Pain for up to 3 days. , Disp-10 tablet, R-0Normal       !! - Potential duplicate medications found. Please discuss with provider.       CONTINUE these medications which have NOT CHANGED    Details   fluticasone-salmeterol (ADVAIR) 500-50 MCG/DOSE diskus inhaler Inhale 1 puff into the lungs every 12 hoursHistorical Med      sertraline (ZOLOFT) 100 MG tablet Take 100 mg by mouth daily At nightHistorical Med      busPIRone (BUSPAR) 15 MG tablet Take 15 mg by mouth 2 times dailyHistorical Med      LORazepam (ATIVAN) 1 MG tablet Take 1 mg by mouth 2 times daily. Historical Med      !! HYDROcodone-acetaminophen (NORCO) 5-325 MG per tablet Take 1 tablet by mouth every 6 hours as needed for Pain. Historical Med      amoxicillin-clavulanate (AUGMENTIN) 875-125 MG per tablet Take 1 tablet by mouth 2 times dailyHistorical Med      cetirizine (ZYRTEC) 10 MG tablet Take 10 mg by mouth dailyHistorical Med      albuterol sulfate  (90 BASE) MCG/ACT inhaler Inhale 2 puffs into the lungs every 6 hours as needed for Wheezing      ibuprofen (ADVIL;MOTRIN) 800 MG tablet Take 800 mg by mouth every 6 hours as needed for Pain       !! - Potential duplicate medications found. Please discuss with provider. Activity: activity as tolerated  Diet: regular diet    Follow-up with me in 1 week.     Note that over 30 minutes was spent in preparing discharge papers, discussing discharge with patient, medication review, etc.    Signed:  Sonia Hartley  1/24/2021  10:23 AM

## 2023-09-01 ENCOUNTER — HOSPITAL ENCOUNTER (OUTPATIENT)
Age: 53
Discharge: HOME | End: 2023-09-03

## 2023-09-01 PROCEDURE — 88305 TISSUE EXAM BY PATHOLOGIST: CPT

## 2023-09-07 LAB — SURGICAL PATHOLOGY REPORT: NORMAL
